# Patient Record
Sex: FEMALE | Race: BLACK OR AFRICAN AMERICAN | NOT HISPANIC OR LATINO
[De-identification: names, ages, dates, MRNs, and addresses within clinical notes are randomized per-mention and may not be internally consistent; named-entity substitution may affect disease eponyms.]

---

## 2017-01-05 ENCOUNTER — APPOINTMENT (OUTPATIENT)
Dept: HEART AND VASCULAR | Facility: CLINIC | Age: 67
End: 2017-01-05

## 2017-01-05 VITALS
WEIGHT: 160 LBS | HEART RATE: 70 BPM | SYSTOLIC BLOOD PRESSURE: 120 MMHG | BODY MASS INDEX: 28.35 KG/M2 | HEIGHT: 63 IN | DIASTOLIC BLOOD PRESSURE: 60 MMHG

## 2017-01-26 ENCOUNTER — LABORATORY RESULT (OUTPATIENT)
Age: 67
End: 2017-01-26

## 2017-01-26 ENCOUNTER — APPOINTMENT (OUTPATIENT)
Dept: ENDOCRINOLOGY | Facility: CLINIC | Age: 67
End: 2017-01-26

## 2017-01-26 VITALS
DIASTOLIC BLOOD PRESSURE: 65 MMHG | SYSTOLIC BLOOD PRESSURE: 128 MMHG | BODY MASS INDEX: 27.63 KG/M2 | WEIGHT: 156 LBS | HEART RATE: 60 BPM

## 2017-01-26 DIAGNOSIS — I10 ESSENTIAL (PRIMARY) HYPERTENSION: ICD-10-CM

## 2017-01-27 ENCOUNTER — APPOINTMENT (OUTPATIENT)
Dept: SURGERY | Facility: CLINIC | Age: 67
End: 2017-01-27

## 2017-01-27 LAB
25(OH)D3 SERPL-MCNC: 46.8 NG/ML
ALBUMIN SERPL ELPH-MCNC: 4.6 G/DL
ALP BLD-CCNC: 55 U/L
ALT SERPL-CCNC: 23 U/L
ANION GAP SERPL CALC-SCNC: 14 MMOL/L
AST SERPL-CCNC: 23 U/L
BASOPHILS # BLD AUTO: 0.04 K/UL
BASOPHILS NFR BLD AUTO: 1 %
BILIRUB SERPL-MCNC: 0.6 MG/DL
BUN SERPL-MCNC: 13 MG/DL
CALCIUM SERPL-MCNC: 9.8 MG/DL
CHLORIDE SERPL-SCNC: 103 MMOL/L
CO2 SERPL-SCNC: 26 MMOL/L
CREAT SERPL-MCNC: 0.64 MG/DL
EOSINOPHIL # BLD AUTO: 0.08 K/UL
EOSINOPHIL NFR BLD AUTO: 1.9 %
FOLATE SERPL-MCNC: >20 NG/ML
GLUCOSE SERPL-MCNC: 102 MG/DL
HBA1C MFR BLD HPLC: 6.3 %
HCT VFR BLD CALC: 35.2 %
HGB BLD-MCNC: 11.4 G/DL
IMM GRANULOCYTES NFR BLD AUTO: 1 %
LYMPHOCYTES # BLD AUTO: 1.39 K/UL
LYMPHOCYTES NFR BLD AUTO: 33.7 %
MAN DIFF?: NORMAL
MCHC RBC-ENTMCNC: 22.8 PG
MCHC RBC-ENTMCNC: 32.4 GM/DL
MCV RBC AUTO: 70.3 FL
MONOCYTES # BLD AUTO: 0.35 K/UL
MONOCYTES NFR BLD AUTO: 8.5 %
NEUTROPHILS # BLD AUTO: 2.22 K/UL
NEUTROPHILS NFR BLD AUTO: 53.9 %
PLATELET # BLD AUTO: 261 K/UL
POTASSIUM SERPL-SCNC: 4 MMOL/L
PROT SERPL-MCNC: 7.1 G/DL
RBC # BLD: 5.01 M/UL
RBC # FLD: 14.9 %
SELENIUM SERPL-MCNC: 123 UG/L
SODIUM SERPL-SCNC: 143 MMOL/L
T3 SERPL-MCNC: 75 NG/DL
T4 FREE SERPL-MCNC: 1.1 NG/DL
TSH SERPL-ACNC: 0.7 UIU/ML
VIT B12 SERPL-MCNC: 1414 PG/ML
WBC # FLD AUTO: 4.12 K/UL

## 2017-01-31 LAB
COPPER SERPL-MCNC: 130 UG/DL
VIT B1 SERPL-MCNC: 199 NMOL/L
VIT B6 SERPL-MCNC: 69.2 UG/L
ZINC SERPL-MCNC: 87 UG/DL

## 2017-02-03 ENCOUNTER — APPOINTMENT (OUTPATIENT)
Dept: SURGERY | Facility: CLINIC | Age: 67
End: 2017-02-03

## 2017-02-03 VITALS
TEMPERATURE: 97.6 F | WEIGHT: 154.06 LBS | HEIGHT: 63 IN | OXYGEN SATURATION: 98 % | BODY MASS INDEX: 27.3 KG/M2 | DIASTOLIC BLOOD PRESSURE: 79 MMHG | SYSTOLIC BLOOD PRESSURE: 126 MMHG | HEART RATE: 64 BPM

## 2017-02-09 ENCOUNTER — APPOINTMENT (OUTPATIENT)
Dept: HEART AND VASCULAR | Facility: CLINIC | Age: 67
End: 2017-02-09

## 2017-04-11 ENCOUNTER — OUTPATIENT (OUTPATIENT)
Dept: OUTPATIENT SERVICES | Facility: HOSPITAL | Age: 67
LOS: 1 days | End: 2017-04-11
Payer: COMMERCIAL

## 2017-04-11 DIAGNOSIS — Z98.49 CATARACT EXTRACTION STATUS, UNSPECIFIED EYE: Chronic | ICD-10-CM

## 2017-04-11 DIAGNOSIS — Z96.652 PRESENCE OF LEFT ARTIFICIAL KNEE JOINT: Chronic | ICD-10-CM

## 2017-04-11 DIAGNOSIS — Z90.49 ACQUIRED ABSENCE OF OTHER SPECIFIED PARTS OF DIGESTIVE TRACT: Chronic | ICD-10-CM

## 2017-04-11 DIAGNOSIS — Z98.89 OTHER SPECIFIED POSTPROCEDURAL STATES: Chronic | ICD-10-CM

## 2017-04-11 PROCEDURE — 76536 US EXAM OF HEAD AND NECK: CPT | Mod: 26

## 2017-04-11 PROCEDURE — 76536 US EXAM OF HEAD AND NECK: CPT

## 2017-04-17 ENCOUNTER — APPOINTMENT (OUTPATIENT)
Dept: HEART AND VASCULAR | Facility: CLINIC | Age: 67
End: 2017-04-17

## 2017-05-11 ENCOUNTER — LABORATORY RESULT (OUTPATIENT)
Age: 67
End: 2017-05-11

## 2017-05-11 ENCOUNTER — APPOINTMENT (OUTPATIENT)
Dept: ENDOCRINOLOGY | Facility: CLINIC | Age: 67
End: 2017-05-11

## 2017-05-11 VITALS
WEIGHT: 141 LBS | DIASTOLIC BLOOD PRESSURE: 54 MMHG | SYSTOLIC BLOOD PRESSURE: 115 MMHG | HEART RATE: 61 BPM | BODY MASS INDEX: 24.98 KG/M2

## 2017-05-11 DIAGNOSIS — E66.01 MORBID (SEVERE) OBESITY DUE TO EXCESS CALORIES: ICD-10-CM

## 2017-05-11 DIAGNOSIS — Z87.898 PERSONAL HISTORY OF OTHER SPECIFIED CONDITIONS: ICD-10-CM

## 2017-05-12 ENCOUNTER — APPOINTMENT (OUTPATIENT)
Dept: SURGERY | Facility: CLINIC | Age: 67
End: 2017-05-12

## 2017-05-12 ENCOUNTER — LABORATORY RESULT (OUTPATIENT)
Age: 67
End: 2017-05-12

## 2017-05-12 ENCOUNTER — RESULT REVIEW (OUTPATIENT)
Age: 67
End: 2017-05-12

## 2017-05-12 VITALS
SYSTOLIC BLOOD PRESSURE: 113 MMHG | TEMPERATURE: 97.9 F | DIASTOLIC BLOOD PRESSURE: 60 MMHG | WEIGHT: 141 LBS | HEART RATE: 59 BPM | HEIGHT: 63 IN | BODY MASS INDEX: 24.98 KG/M2

## 2017-05-12 LAB
25(OH)D3 SERPL-MCNC: 47.9 NG/ML
ALBUMIN SERPL ELPH-MCNC: 4.4 G/DL
ALBUMIN SERPL ELPH-MCNC: 4.5 G/DL
ALP BLD-CCNC: 50 U/L
ALP BLD-CCNC: 51 U/L
ALT SERPL-CCNC: 38 U/L
ALT SERPL-CCNC: 40 U/L
ANION GAP SERPL CALC-SCNC: 15 MMOL/L
ANION GAP SERPL CALC-SCNC: 15 MMOL/L
AST SERPL-CCNC: 33 U/L
AST SERPL-CCNC: 34 U/L
BASOPHILS # BLD AUTO: 0 K/UL
BASOPHILS # BLD AUTO: 0.07 K/UL
BASOPHILS NFR BLD AUTO: 0 %
BASOPHILS NFR BLD AUTO: 1.8 %
BILIRUB SERPL-MCNC: 0.4 MG/DL
BILIRUB SERPL-MCNC: 0.4 MG/DL
BUN SERPL-MCNC: 16 MG/DL
BUN SERPL-MCNC: 18 MG/DL
CA-I SERPL-SCNC: 1.33 MMOL/L
CALCIUM SERPL-MCNC: 10 MG/DL
CALCIUM SERPL-MCNC: 10.3 MG/DL
CALCIUM SERPL-MCNC: 10.3 MG/DL
CHLORIDE SERPL-SCNC: 101 MMOL/L
CHLORIDE SERPL-SCNC: 99 MMOL/L
CHOLEST SERPL-MCNC: 152 MG/DL
CHOLEST SERPL-MCNC: 156 MG/DL
CHOLEST/HDLC SERPL: 2 RATIO
CHOLEST/HDLC SERPL: 2 RATIO
CO2 SERPL-SCNC: 24 MMOL/L
CO2 SERPL-SCNC: 26 MMOL/L
CREAT SERPL-MCNC: 0.62 MG/DL
CREAT SERPL-MCNC: 0.71 MG/DL
CREAT SPEC-SCNC: 84 MG/DL
EOSINOPHIL # BLD AUTO: 0.2 K/UL
EOSINOPHIL # BLD AUTO: 0.34 K/UL
EOSINOPHIL NFR BLD AUTO: 5.3 %
EOSINOPHIL NFR BLD AUTO: 7.9 %
FOLATE SERPL-MCNC: >20 NG/ML
FOLATE SERPL-MCNC: >20 NG/ML
GLUCOSE SERPL-MCNC: 104 MG/DL
GLUCOSE SERPL-MCNC: 91 MG/DL
HBA1C MFR BLD HPLC: 6.1 %
HCT VFR BLD CALC: 33.8 %
HCT VFR BLD CALC: 34.5 %
HDLC SERPL-MCNC: 88 MG/DL
HDLC SERPL-MCNC: 92 MG/DL
HGB BLD-MCNC: 10.9 G/DL
HGB BLD-MCNC: 11 G/DL
INR PPP: 1.04 RATIO
IRON SATN MFR SERPL: 27 %
IRON SERPL-MCNC: 64 UG/DL
LDLC SERPL CALC-MCNC: 46 MG/DL
LDLC SERPL CALC-MCNC: 54 MG/DL
LYMPHOCYTES # BLD AUTO: 1.84 K/UL
LYMPHOCYTES # BLD AUTO: 1.92 K/UL
LYMPHOCYTES NFR BLD AUTO: 43 %
LYMPHOCYTES NFR BLD AUTO: 50.4 %
MAN DIFF?: NORMAL
MAN DIFF?: NORMAL
MCHC RBC-ENTMCNC: 23.1 PG
MCHC RBC-ENTMCNC: 23.3 PG
MCHC RBC-ENTMCNC: 31.9 GM/DL
MCHC RBC-ENTMCNC: 32.2 GM/DL
MCV RBC AUTO: 72.2 FL
MCV RBC AUTO: 72.5 FL
MICROALBUMIN 24H UR DL<=1MG/L-MCNC: 0.5 MG/DL
MICROALBUMIN/CREAT 24H UR-RTO: 6
MONOCYTES # BLD AUTO: 0.17 K/UL
MONOCYTES # BLD AUTO: 0.49 K/UL
MONOCYTES NFR BLD AUTO: 11.4 %
MONOCYTES NFR BLD AUTO: 4.4 %
NEUTROPHILS # BLD AUTO: 1.45 K/UL
NEUTROPHILS # BLD AUTO: 1.46 K/UL
NEUTROPHILS NFR BLD AUTO: 34.2 %
NEUTROPHILS NFR BLD AUTO: 38.1 %
PARATHYROID HORMONE INTACT: 46 PG/ML
PLATELET # BLD AUTO: 302 K/UL
PLATELET # BLD AUTO: 324 K/UL
POTASSIUM SERPL-SCNC: 4.5 MMOL/L
POTASSIUM SERPL-SCNC: 5 MMOL/L
PROT SERPL-MCNC: 6.9 G/DL
PROT SERPL-MCNC: 7.1 G/DL
PT BLD: 11.8 SEC
RBC # BLD: 4.68 M/UL
RBC # BLD: 4.76 M/UL
RBC # FLD: 14.7 %
RBC # FLD: 14.9 %
SODIUM SERPL-SCNC: 140 MMOL/L
SODIUM SERPL-SCNC: 140 MMOL/L
T3 SERPL-MCNC: 69 NG/DL
T4 FREE SERPL-MCNC: 1.2 NG/DL
TIBC SERPL-MCNC: 235 UG/DL
TRIGL SERPL-MCNC: 71 MG/DL
TRIGL SERPL-MCNC: 71 MG/DL
TSH SERPL-ACNC: 0.85 UIU/ML
TSH SERPL-ACNC: 0.95 UIU/ML
UIBC SERPL-MCNC: 171 UG/DL
VIT B12 SERPL-MCNC: 1054 PG/ML
VIT B12 SERPL-MCNC: 1061 PG/ML
WBC # FLD AUTO: 3.81 K/UL
WBC # FLD AUTO: 4.28 K/UL

## 2017-05-13 LAB
25(OH)D3 SERPL-MCNC: 42.8 NG/ML
PREALB SERPL NEPH-MCNC: 25 MG/DL

## 2017-05-14 LAB
VIT A SERPL-MCNC: 63 UG/DL
ZINC SERPL-MCNC: 77 UG/DL

## 2017-05-15 ENCOUNTER — APPOINTMENT (OUTPATIENT)
Dept: HEART AND VASCULAR | Facility: CLINIC | Age: 67
End: 2017-05-15

## 2017-05-15 VITALS
HEIGHT: 63 IN | DIASTOLIC BLOOD PRESSURE: 60 MMHG | BODY MASS INDEX: 24.63 KG/M2 | HEART RATE: 59 BPM | WEIGHT: 139 LBS | SYSTOLIC BLOOD PRESSURE: 102 MMHG

## 2017-05-16 LAB — VIT B1 SERPL-MCNC: 177.7 NMOL/L

## 2017-05-17 LAB
A-TOCOPHEROL VIT E SERPL-MCNC: 18.8 MG/L
BETA+GAMMA TOCOPHEROL SERPL-MCNC: <1 MG/L

## 2017-05-26 LAB
HBA1C MFR BLD HPLC: 6.2 %
VIT B1 SERPL-MCNC: 207.2 NMOL/L
VIT B6 SERPL-MCNC: 93.8 UG/L

## 2017-06-15 ENCOUNTER — APPOINTMENT (OUTPATIENT)
Dept: PLASTIC SURGERY | Facility: CLINIC | Age: 67
End: 2017-06-15

## 2017-06-29 ENCOUNTER — APPOINTMENT (OUTPATIENT)
Dept: HEART AND VASCULAR | Facility: CLINIC | Age: 67
End: 2017-06-29

## 2017-06-29 VITALS
SYSTOLIC BLOOD PRESSURE: 110 MMHG | HEIGHT: 63 IN | DIASTOLIC BLOOD PRESSURE: 62 MMHG | WEIGHT: 135 LBS | BODY MASS INDEX: 23.92 KG/M2 | HEART RATE: 75 BPM

## 2017-07-27 ENCOUNTER — APPOINTMENT (OUTPATIENT)
Dept: HEART AND VASCULAR | Facility: CLINIC | Age: 67
End: 2017-07-27
Payer: MEDICARE

## 2017-07-27 VITALS
BODY MASS INDEX: 24.1 KG/M2 | SYSTOLIC BLOOD PRESSURE: 102 MMHG | HEIGHT: 63 IN | WEIGHT: 136 LBS | HEART RATE: 55 BPM | DIASTOLIC BLOOD PRESSURE: 58 MMHG

## 2017-07-27 PROCEDURE — 99213 OFFICE O/P EST LOW 20 MIN: CPT

## 2017-08-23 ENCOUNTER — APPOINTMENT (OUTPATIENT)
Dept: ENDOCRINOLOGY | Facility: CLINIC | Age: 67
End: 2017-08-23
Payer: MEDICARE

## 2017-08-23 VITALS
BODY MASS INDEX: 24.45 KG/M2 | WEIGHT: 138 LBS | SYSTOLIC BLOOD PRESSURE: 102 MMHG | DIASTOLIC BLOOD PRESSURE: 63 MMHG | HEART RATE: 56 BPM

## 2017-08-23 PROCEDURE — 99214 OFFICE O/P EST MOD 30 MIN: CPT

## 2017-09-07 ENCOUNTER — APPOINTMENT (OUTPATIENT)
Dept: HEART AND VASCULAR | Facility: CLINIC | Age: 67
End: 2017-09-07
Payer: MEDICARE

## 2017-09-07 VITALS
HEART RATE: 60 BPM | BODY MASS INDEX: 24.09 KG/M2 | WEIGHT: 136 LBS | DIASTOLIC BLOOD PRESSURE: 70 MMHG | SYSTOLIC BLOOD PRESSURE: 106 MMHG

## 2017-09-07 PROCEDURE — 99213 OFFICE O/P EST LOW 20 MIN: CPT

## 2017-09-22 ENCOUNTER — APPOINTMENT (OUTPATIENT)
Dept: SURGERY | Facility: CLINIC | Age: 67
End: 2017-09-22
Payer: MEDICARE

## 2017-09-22 VITALS
WEIGHT: 137.5 LBS | SYSTOLIC BLOOD PRESSURE: 102 MMHG | HEIGHT: 63 IN | DIASTOLIC BLOOD PRESSURE: 61 MMHG | BODY MASS INDEX: 24.36 KG/M2 | OXYGEN SATURATION: 99 % | TEMPERATURE: 97.8 F | HEART RATE: 56 BPM

## 2017-09-22 PROCEDURE — 99213 OFFICE O/P EST LOW 20 MIN: CPT

## 2017-10-05 ENCOUNTER — APPOINTMENT (OUTPATIENT)
Dept: PLASTIC SURGERY | Facility: CLINIC | Age: 67
End: 2017-10-05

## 2017-11-17 ENCOUNTER — RX RENEWAL (OUTPATIENT)
Age: 67
End: 2017-11-17

## 2017-12-04 ENCOUNTER — LABORATORY RESULT (OUTPATIENT)
Age: 67
End: 2017-12-04

## 2017-12-08 LAB
25(OH)D3 SERPL-MCNC: 53.4 NG/ML
ALBUMIN SERPL ELPH-MCNC: 4.7 G/DL
ALP BLD-CCNC: 70 U/L
ALT SERPL-CCNC: 187 U/L
ANION GAP SERPL CALC-SCNC: 18 MMOL/L
AST SERPL-CCNC: 103 U/L
BASOPHILS # BLD AUTO: 0.09 K/UL
BASOPHILS NFR BLD AUTO: 2.6 %
BILIRUB SERPL-MCNC: 0.5 MG/DL
BUN SERPL-MCNC: 14 MG/DL
CALCIUM SERPL-MCNC: 10.4 MG/DL
CHLORIDE SERPL-SCNC: 98 MMOL/L
CHOLEST SERPL-MCNC: 160 MG/DL
CHOLEST/HDLC SERPL: 1.8 RATIO
CO2 SERPL-SCNC: 25 MMOL/L
COPPER SERPL-MCNC: 129 UG/DL
CREAT SERPL-MCNC: 0.81 MG/DL
CREAT SPEC-SCNC: 67 MG/DL
EOSINOPHIL # BLD AUTO: 0.22 K/UL
EOSINOPHIL NFR BLD AUTO: 6.1
FOLATE SERPL-MCNC: >20 NG/ML
GLUCOSE SERPL-MCNC: 104 MG/DL
HBA1C MFR BLD HPLC: 6.3 %
HCT VFR BLD CALC: 34.6 %
HDLC SERPL-MCNC: 89 MG/DL
HGB BLD-MCNC: 11.3 G/DL
IRON SERPL-MCNC: 55 UG/DL
LDLC SERPL CALC-MCNC: 58 MG/DL
LYMPHOCYTES # BLD AUTO: 1.66 K/UL
LYMPHOCYTES NFR BLD AUTO: 46.1 %
MAN DIFF?: NORMAL
MCHC RBC-ENTMCNC: 23.8 PG
MCHC RBC-ENTMCNC: 32.7 GM/DL
MCV RBC AUTO: 73 FL
MICROALBUMIN 24H UR DL<=1MG/L-MCNC: <0.3 MG/DL
MICROALBUMIN/CREAT 24H UR-RTO: NORMAL
MONOCYTES # BLD AUTO: 0.15 K/UL
MONOCYTES NFR BLD AUTO: 4.3 %
NEUTROPHILS # BLD AUTO: 1.41 K/UL
NEUTROPHILS NFR BLD AUTO: 39.1 %
PLATELET # BLD AUTO: 320 K/UL
POTASSIUM SERPL-SCNC: 4.3 MMOL/L
PREALB SERPL NEPH-MCNC: 28 MG/DL
PROT SERPL-MCNC: 7.4 G/DL
RBC # BLD: 4.74 M/UL
RBC # FLD: 13.3 %
SELENIUM SERPL-MCNC: 160 UG/L
SODIUM SERPL-SCNC: 141 MMOL/L
T3 SERPL-MCNC: 72 NG/DL
T4 FREE SERPL-MCNC: 1.1 NG/DL
TRIGL SERPL-MCNC: 67 MG/DL
TSH SERPL-ACNC: 1.09 UIU/ML
VIT B1 SERPL-MCNC: 160.1 NMOL/L
VIT B12 SERPL-MCNC: 1289 PG/ML
VIT B6 SERPL-MCNC: 72.5 UG/L
WBC # FLD AUTO: 3.6 K/UL
ZINC SERPL-MCNC: 90 UG/DL

## 2017-12-13 ENCOUNTER — APPOINTMENT (OUTPATIENT)
Dept: ENDOCRINOLOGY | Facility: CLINIC | Age: 67
End: 2017-12-13
Payer: MEDICARE

## 2017-12-13 VITALS
DIASTOLIC BLOOD PRESSURE: 68 MMHG | HEART RATE: 59 BPM | HEIGHT: 63 IN | WEIGHT: 135 LBS | BODY MASS INDEX: 23.92 KG/M2 | SYSTOLIC BLOOD PRESSURE: 120 MMHG

## 2017-12-13 DIAGNOSIS — R74.8 ABNORMAL LEVELS OF OTHER SERUM ENZYMES: ICD-10-CM

## 2017-12-13 DIAGNOSIS — E11.65 TYPE 2 DIABETES MELLITUS WITH HYPERGLYCEMIA: ICD-10-CM

## 2017-12-13 PROCEDURE — 99214 OFFICE O/P EST MOD 30 MIN: CPT

## 2017-12-21 ENCOUNTER — LABORATORY RESULT (OUTPATIENT)
Age: 67
End: 2017-12-21

## 2017-12-21 LAB
BASOPHILS # BLD AUTO: 0.17 K/UL
BASOPHILS NFR BLD AUTO: 5.3 %
CA-I SERPL-SCNC: 1.29 MMOL/L
CALCIUM SERPL-MCNC: 10 MG/DL
EOSINOPHIL # BLD AUTO: 0.14 K/UL
EOSINOPHIL NFR BLD AUTO: 4.4
FOLATE SERPL-MCNC: >20 NG/ML
HCT VFR BLD CALC: 34.7 %
HGB BLD-MCNC: 11.1 G/DL
INR PPP: 1.04 RATIO
LYMPHOCYTES # BLD AUTO: 1.5 K/UL
LYMPHOCYTES NFR BLD AUTO: 46.5 %
MAN DIFF?: NORMAL
MCHC RBC-ENTMCNC: 23.5 PG
MCHC RBC-ENTMCNC: 32 GM/DL
MCV RBC AUTO: 73.5 FL
MONOCYTES # BLD AUTO: 0.14 K/UL
MONOCYTES NFR BLD AUTO: 4.4 %
NEUTROPHILS # BLD AUTO: 1.16 K/UL
NEUTROPHILS NFR BLD AUTO: 36 %
PARATHYROID HORMONE INTACT: 64 PG/ML
PLATELET # BLD AUTO: 250 K/UL
PT BLD: 11.8 SEC
RBC # BLD: 4.72 M/UL
RBC # FLD: 13.4 %
TSH SERPL-ACNC: 0.77 UIU/ML
VIT B12 SERPL-MCNC: 898 PG/ML
WBC # FLD AUTO: 3.23 K/UL

## 2017-12-22 LAB
25(OH)D3 SERPL-MCNC: 49.1 NG/ML
ALBUMIN SERPL ELPH-MCNC: 4.7 G/DL
ALP BLD-CCNC: 62 U/L
ALT SERPL-CCNC: 93 U/L
ANION GAP SERPL CALC-SCNC: 14 MMOL/L
AST SERPL-CCNC: 43 U/L
BILIRUB SERPL-MCNC: 0.6 MG/DL
BUN SERPL-MCNC: 17 MG/DL
CALCIUM SERPL-MCNC: 10 MG/DL
CHLORIDE SERPL-SCNC: 102 MMOL/L
CHOLEST SERPL-MCNC: 160 MG/DL
CHOLEST/HDLC SERPL: 1.6 RATIO
CO2 SERPL-SCNC: 28 MMOL/L
CREAT SERPL-MCNC: 0.7 MG/DL
GLUCOSE SERPL-MCNC: 95 MG/DL
HBA1C MFR BLD HPLC: 6.2 %
HDLC SERPL-MCNC: 97 MG/DL
IRON SATN MFR SERPL: 30 %
IRON SERPL-MCNC: 78 UG/DL
LDLC SERPL CALC-MCNC: 50 MG/DL
POTASSIUM SERPL-SCNC: 4.5 MMOL/L
PREALB SERPL NEPH-MCNC: 25 MG/DL
PROT SERPL-MCNC: 7.2 G/DL
SODIUM SERPL-SCNC: 144 MMOL/L
TIBC SERPL-MCNC: 261 UG/DL
TRIGL SERPL-MCNC: 66 MG/DL
UIBC SERPL-MCNC: 183 UG/DL

## 2017-12-23 LAB — VIT B1 SERPL-MCNC: 183.8 NMOL/L

## 2017-12-26 LAB — VIT A SERPL-MCNC: 106 UG/DL

## 2017-12-27 LAB — ZINC SERPL-MCNC: 73 UG/DL

## 2017-12-28 LAB
A-TOCOPHEROL VIT E SERPL-MCNC: 15.3 MG/L
BETA+GAMMA TOCOPHEROL SERPL-MCNC: <1 MG/L

## 2018-01-12 ENCOUNTER — MEDICATION RENEWAL (OUTPATIENT)
Age: 68
End: 2018-01-12

## 2018-01-16 ENCOUNTER — RX RENEWAL (OUTPATIENT)
Age: 68
End: 2018-01-16

## 2018-01-26 ENCOUNTER — APPOINTMENT (OUTPATIENT)
Dept: SURGERY | Facility: CLINIC | Age: 68
End: 2018-01-26
Payer: MEDICARE

## 2018-01-26 VITALS
BODY MASS INDEX: 23.39 KG/M2 | HEART RATE: 55 BPM | WEIGHT: 132 LBS | TEMPERATURE: 97.6 F | DIASTOLIC BLOOD PRESSURE: 77 MMHG | OXYGEN SATURATION: 99 % | SYSTOLIC BLOOD PRESSURE: 127 MMHG | HEIGHT: 63 IN

## 2018-01-26 PROCEDURE — 99213 OFFICE O/P EST LOW 20 MIN: CPT

## 2018-02-08 ENCOUNTER — APPOINTMENT (OUTPATIENT)
Dept: HEART AND VASCULAR | Facility: CLINIC | Age: 68
End: 2018-02-08
Payer: COMMERCIAL

## 2018-02-08 VITALS
WEIGHT: 136 LBS | DIASTOLIC BLOOD PRESSURE: 68 MMHG | HEART RATE: 69 BPM | BODY MASS INDEX: 24.1 KG/M2 | SYSTOLIC BLOOD PRESSURE: 110 MMHG | HEIGHT: 63 IN

## 2018-02-08 PROCEDURE — 99214 OFFICE O/P EST MOD 30 MIN: CPT

## 2018-02-16 ENCOUNTER — MEDICATION RENEWAL (OUTPATIENT)
Age: 68
End: 2018-02-16

## 2018-04-23 ENCOUNTER — APPOINTMENT (OUTPATIENT)
Dept: HEART AND VASCULAR | Facility: CLINIC | Age: 68
End: 2018-04-23
Payer: COMMERCIAL

## 2018-04-23 VITALS
WEIGHT: 136 LBS | DIASTOLIC BLOOD PRESSURE: 70 MMHG | BODY MASS INDEX: 24.09 KG/M2 | SYSTOLIC BLOOD PRESSURE: 120 MMHG | HEART RATE: 53 BPM

## 2018-04-23 PROCEDURE — 99213 OFFICE O/P EST LOW 20 MIN: CPT | Mod: 25

## 2018-04-23 PROCEDURE — 93000 ELECTROCARDIOGRAM COMPLETE: CPT

## 2018-04-27 ENCOUNTER — APPOINTMENT (OUTPATIENT)
Dept: SURGERY | Facility: CLINIC | Age: 68
End: 2018-04-27
Payer: MEDICARE

## 2018-04-27 VITALS
TEMPERATURE: 97.5 F | HEART RATE: 52 BPM | HEIGHT: 63 IN | OXYGEN SATURATION: 99 % | SYSTOLIC BLOOD PRESSURE: 132 MMHG | WEIGHT: 138.25 LBS | DIASTOLIC BLOOD PRESSURE: 70 MMHG | BODY MASS INDEX: 24.5 KG/M2

## 2018-04-27 PROCEDURE — 99213 OFFICE O/P EST LOW 20 MIN: CPT

## 2018-04-30 ENCOUNTER — APPOINTMENT (OUTPATIENT)
Dept: ENDOCRINOLOGY | Facility: CLINIC | Age: 68
End: 2018-04-30
Payer: MEDICARE

## 2018-04-30 VITALS
WEIGHT: 138 LBS | DIASTOLIC BLOOD PRESSURE: 59 MMHG | HEART RATE: 54 BPM | BODY MASS INDEX: 24.45 KG/M2 | SYSTOLIC BLOOD PRESSURE: 109 MMHG

## 2018-04-30 DIAGNOSIS — E04.2 NONTOXIC MULTINODULAR GOITER: ICD-10-CM

## 2018-04-30 PROCEDURE — 99215 OFFICE O/P EST HI 40 MIN: CPT

## 2018-04-30 RX ORDER — METFORMIN HYDROCHLORIDE 1000 MG/1
1000 TABLET, COATED ORAL TWICE DAILY
Qty: 60 | Refills: 5 | Status: DISCONTINUED | COMMUNITY
Start: 2018-01-12 | End: 2018-04-30

## 2018-04-30 RX ORDER — TIMOLOL MALEATE 5 MG/ML
0.5 SOLUTION OPHTHALMIC
Qty: 10 | Refills: 0 | Status: ACTIVE | COMMUNITY
Start: 2018-04-18

## 2018-07-18 ENCOUNTER — APPOINTMENT (OUTPATIENT)
Dept: RADIOLOGY | Facility: HOSPITAL | Age: 68
End: 2018-07-18

## 2018-08-14 ENCOUNTER — APPOINTMENT (OUTPATIENT)
Dept: ULTRASOUND IMAGING | Facility: HOSPITAL | Age: 68
End: 2018-08-14

## 2018-08-28 ENCOUNTER — APPOINTMENT (OUTPATIENT)
Dept: ENDOCRINOLOGY | Facility: CLINIC | Age: 68
End: 2018-08-28
Payer: COMMERCIAL

## 2018-08-28 VITALS
BODY MASS INDEX: 24.45 KG/M2 | DIASTOLIC BLOOD PRESSURE: 56 MMHG | HEART RATE: 47 BPM | WEIGHT: 138 LBS | HEIGHT: 63 IN | SYSTOLIC BLOOD PRESSURE: 112 MMHG

## 2018-08-28 PROCEDURE — 99214 OFFICE O/P EST MOD 30 MIN: CPT

## 2018-08-29 LAB
25(OH)D3 SERPL-MCNC: 49.9 NG/ML
ALBUMIN SERPL ELPH-MCNC: 4.4 G/DL
ALP BLD-CCNC: 57 U/L
ALT SERPL-CCNC: 26 U/L
ANION GAP SERPL CALC-SCNC: 14 MMOL/L
AST SERPL-CCNC: 26 U/L
BILIRUB SERPL-MCNC: 0.5 MG/DL
BUN SERPL-MCNC: 15 MG/DL
CALCIUM SERPL-MCNC: 9.7 MG/DL
CALCIUM SERPL-MCNC: 9.7 MG/DL
CHLORIDE SERPL-SCNC: 99 MMOL/L
CHOLEST SERPL-MCNC: 159 MG/DL
CHOLEST/HDLC SERPL: 2.2 RATIO
CO2 SERPL-SCNC: 26 MMOL/L
CREAT SERPL-MCNC: 0.75 MG/DL
CREAT SPEC-SCNC: 17 MG/DL
GLUCOSE SERPL-MCNC: 112 MG/DL
HBA1C MFR BLD HPLC: 6.7 %
HDLC SERPL-MCNC: 71 MG/DL
LDLC SERPL CALC-MCNC: 71 MG/DL
MICROALBUMIN 24H UR DL<=1MG/L-MCNC: <1.2 MG/DL
MICROALBUMIN/CREAT 24H UR-RTO: NORMAL
PARATHYROID HORMONE INTACT: 47 PG/ML
POTASSIUM SERPL-SCNC: 4.9 MMOL/L
PROT SERPL-MCNC: 6.9 G/DL
SODIUM SERPL-SCNC: 139 MMOL/L
TRIGL SERPL-MCNC: 87 MG/DL

## 2018-10-19 ENCOUNTER — APPOINTMENT (OUTPATIENT)
Dept: SURGERY | Facility: CLINIC | Age: 68
End: 2018-10-19
Payer: COMMERCIAL

## 2018-10-19 VITALS
TEMPERATURE: 98 F | OXYGEN SATURATION: 100 % | HEART RATE: 51 BPM | DIASTOLIC BLOOD PRESSURE: 60 MMHG | WEIGHT: 139.5 LBS | HEIGHT: 63 IN | BODY MASS INDEX: 24.72 KG/M2 | SYSTOLIC BLOOD PRESSURE: 125 MMHG

## 2018-10-19 DIAGNOSIS — E66.01 MORBID (SEVERE) OBESITY DUE TO EXCESS CALORIES: ICD-10-CM

## 2018-10-19 PROCEDURE — 99213 OFFICE O/P EST LOW 20 MIN: CPT

## 2018-11-26 ENCOUNTER — APPOINTMENT (OUTPATIENT)
Dept: HEART AND VASCULAR | Facility: CLINIC | Age: 68
End: 2018-11-26
Payer: COMMERCIAL

## 2018-11-26 VITALS
DIASTOLIC BLOOD PRESSURE: 64 MMHG | SYSTOLIC BLOOD PRESSURE: 112 MMHG | WEIGHT: 136 LBS | HEART RATE: 56 BPM | HEIGHT: 63 IN | BODY MASS INDEX: 24.1 KG/M2

## 2018-11-26 PROCEDURE — 99213 OFFICE O/P EST LOW 20 MIN: CPT

## 2018-11-26 RX ORDER — LISINOPRIL 2.5 MG/1
2.5 TABLET ORAL DAILY
Qty: 30 | Refills: 6 | Status: DISCONTINUED | COMMUNITY
End: 2018-11-26

## 2018-11-26 NOTE — DISCUSSION/SUMMARY
[Patient] : the patient [___ Month(s)] : [unfilled] month(s) [FreeTextEntry1] : 67 y/o female with h/o htn, niddm, obesity, near-syncope, hl, s/p robotic assisted lap. gastric bypass and supraumbilical hernia repair here for f/up otday\par \par -Labs 12/17, 8/18 reviewed\par -ekg 4/18 - SB, low voltage, nsra\par -EKG 5/17 reviewed - SB, normal intervals, nsra\par -Continue aspirin \par -Unable to tolerate lipitor and zocor and (crestor - did not want to pay high copay), will continue pravastatin 20 mg qhs \par -d/c lisinopril and repeat bp in 3 weeks, close monitoring of bp\par -Strict blood sugar control for DM management, f/up with Endocrine \par -Echo 8/14- nl ef/wall motion/no significant valuvlar disease\par -Adenosine nuclear stress test 8/14- normal perfusion, no ekg changes\par -F/up 5 months\par

## 2018-12-07 ENCOUNTER — APPOINTMENT (OUTPATIENT)
Dept: SURGERY | Facility: CLINIC | Age: 68
End: 2018-12-07
Payer: COMMERCIAL

## 2018-12-07 VITALS
HEIGHT: 63 IN | DIASTOLIC BLOOD PRESSURE: 75 MMHG | SYSTOLIC BLOOD PRESSURE: 159 MMHG | HEART RATE: 57 BPM | TEMPERATURE: 97.9 F | WEIGHT: 140 LBS | OXYGEN SATURATION: 99 % | BODY MASS INDEX: 24.8 KG/M2

## 2018-12-07 PROCEDURE — 99213 OFFICE O/P EST LOW 20 MIN: CPT

## 2019-04-23 ENCOUNTER — APPOINTMENT (OUTPATIENT)
Dept: ENDOCRINOLOGY | Facility: CLINIC | Age: 69
End: 2019-04-23
Payer: COMMERCIAL

## 2019-04-23 VITALS
DIASTOLIC BLOOD PRESSURE: 64 MMHG | BODY MASS INDEX: 25.16 KG/M2 | SYSTOLIC BLOOD PRESSURE: 117 MMHG | HEIGHT: 63 IN | WEIGHT: 142 LBS | HEART RATE: 50 BPM

## 2019-04-23 PROCEDURE — 99214 OFFICE O/P EST MOD 30 MIN: CPT

## 2019-04-23 NOTE — HISTORY OF PRESENT ILLNESS
[FreeTextEntry1] : A1c went up to 7.2 last month with PCP\par eating whole wheat pumpernickel bagels 3x/week\par eating various fruits: pineapple, nehemiah, berries, and maybe portions are too big\par has Greek yogurt with splenda and fruit every day.\par walking regularly.  walked 26 blocks yesterday\par weight self daily and tries to keep weight within a 5 lb range.  once she is at the upper end of the range, then she reduces her fruit intake\par up to date with ophtho. her general ophtho left [facility] but she is still seeing the retina doctor\par no polyuria, polydipsia, SOB, chest pain, or neuropathy symptoms \par sees podiatry every 3 months\par \par PMH: diabetes, dx age 42.  s/p vitrectomy, laser\par s/p gastric bypass 5/16\par L knee replaced 2011\par s/p cholcyestectomy and appendectomy\par \par Meds:\par metformin ER 500mg/day\par calcium citrate + Mg + Zinc (Fowler brand). 2 tab/day (2 tabs = 500mg calcium, 800 IU vitamin D, Mg 80mg, Zn 10mg)\par Centrum silver chewable, 2 per day\par B complex liquid, every 2 weeks.  vitamin B1 daily.\par FeSol, once a day. \par Benefiber. Colace every day.  Prilosec or Nexium (whichever one is on sale)\par lisinopril 2.5mg/day\par pravastatin 20mg\par timolol eye drops\par Zyrtec prn.\par

## 2019-04-23 NOTE — ASSESSMENT
[FreeTextEntry1] : Diabetes, A1c increasing. h/o retinopathy. Increase metformin back to BID dosing and advised pt to be mindful about fruit portion sizes.  Limit fruit servings to half to 3/4 cup servings.  Try to limit bagels to 1 per week, or eat only half bagel when she is eating bagels (then will be 1.5 bagels per week, if she continues eating them 3x/week)\par \par Thyroid nodules, small (sub-cm).  will send for thyroid sono this summer.\par RTO 6 months

## 2019-04-23 NOTE — DATA REVIEWED
[FreeTextEntry1] : 3/19 (per patient): A1c 7.2%\par 8/18: A1c 6.7%, tot chol 159, trig 87, HDL 71, LDL 71, Cr 0.75, urine microalbumin < 1.2, 25D 49.9\par 12/17: A1c 6.2%,  tot chol 160, HDL 97, LDL 50, trig 66, Hb 11.1, 25D 49.1, folate >20, Ca 10.0, PTH 64, TSH 0.77\par \par thyroid sono, 4/17: homogenous\par R mid nodule 9mm, no suspicious features \par R upper nodule 4mm, no suspicious features \par L upper nodule 9mm, no suspicious features \par L upper nodule 4mm, no suspicious features \par \par bone density, 7/18:\par L1-L4 T -0.8\par R fem neck T -1.1, L fem neck T -1.5\par R tot hip T -0.4; L tot hip T -0.7\par . \par \par

## 2019-04-26 ENCOUNTER — APPOINTMENT (OUTPATIENT)
Dept: SURGERY | Facility: CLINIC | Age: 69
End: 2019-04-26
Payer: COMMERCIAL

## 2019-04-26 VITALS
HEIGHT: 63 IN | TEMPERATURE: 96.9 F | SYSTOLIC BLOOD PRESSURE: 113 MMHG | DIASTOLIC BLOOD PRESSURE: 73 MMHG | OXYGEN SATURATION: 98 % | BODY MASS INDEX: 25.24 KG/M2 | HEART RATE: 52 BPM | WEIGHT: 142.44 LBS

## 2019-04-26 DIAGNOSIS — Z00.00 ENCOUNTER FOR GENERAL ADULT MEDICAL EXAMINATION W/OUT ABNORMAL FINDINGS: ICD-10-CM

## 2019-04-26 PROCEDURE — 99213 OFFICE O/P EST LOW 20 MIN: CPT

## 2019-04-26 NOTE — HISTORY OF PRESENT ILLNESS
[de-identified] : Pt is a 68 y/o F s/p bypass surgery 3 yrs ago who presents today feeling well. Pt recently saw endo, a1c was elevated at 7.0. Pt metformin was increased to twice daily, and was started on a low glycemic diet. Pt reports compliance with vitamin regimen at home, taking calcium daily for osteopenia. Pt states she is going to the bathroom with no problems, and denies n,v. States she is taking fiber supplement daily. States her last colonoscopy  was 2 years ago, polyp found and was benign. F/u in q5 yrs. Pt has history of dilated common bile duct, will repeat labs to monitor LFT's.

## 2019-05-21 LAB
25(OH)D3 SERPL-MCNC: 53.5 NG/ML
A-TOCOPHEROL VIT E SERPL-MCNC: 14.1 MG/L
ALBUMIN SERPL ELPH-MCNC: 4.5 G/DL
ALP BLD-CCNC: 67 U/L
ALT SERPL-CCNC: 57 U/L
ANION GAP SERPL CALC-SCNC: 11 MMOL/L
AST SERPL-CCNC: 37 U/L
BASOPHILS # BLD AUTO: 0.04 K/UL
BASOPHILS NFR BLD AUTO: 1 %
BETA+GAMMA TOCOPHEROL SERPL-MCNC: 0.1 MG/L
BILIRUB SERPL-MCNC: 0.4 MG/DL
BUN SERPL-MCNC: 18 MG/DL
CA-I SERPL-SCNC: 1.29 MMOL/L
CALCIUM SERPL-MCNC: 9.9 MG/DL
CALCIUM SERPL-MCNC: 9.9 MG/DL
CHLORIDE SERPL-SCNC: 102 MMOL/L
CHOLEST SERPL-MCNC: 158 MG/DL
CHOLEST/HDLC SERPL: 2 RATIO
CO2 SERPL-SCNC: 28 MMOL/L
CREAT SERPL-MCNC: 0.75 MG/DL
EOSINOPHIL # BLD AUTO: 0.18 K/UL
EOSINOPHIL NFR BLD AUTO: 4.6 %
ESTIMATED AVERAGE GLUCOSE: 174 MG/DL
FOLATE SERPL-MCNC: >20 NG/ML
GLUCOSE SERPL-MCNC: 115 MG/DL
HBA1C MFR BLD HPLC: 7.7 %
HCT VFR BLD CALC: 40.5 %
HDLC SERPL-MCNC: 78 MG/DL
HGB BLD-MCNC: 12.2 G/DL
IMM GRANULOCYTES NFR BLD AUTO: 0 %
INR PPP: 1.06 RATIO
IRON SATN MFR SERPL: 28 %
IRON SERPL-MCNC: 81 UG/DL
LDLC SERPL CALC-MCNC: 64 MG/DL
LYMPHOCYTES # BLD AUTO: 1.64 K/UL
LYMPHOCYTES NFR BLD AUTO: 41.5 %
MAN DIFF?: NORMAL
MCHC RBC-ENTMCNC: 23 PG
MCHC RBC-ENTMCNC: 30.1 GM/DL
MCV RBC AUTO: 76.3 FL
MONOCYTES # BLD AUTO: 0.32 K/UL
MONOCYTES NFR BLD AUTO: 8.1 %
NEUTROPHILS # BLD AUTO: 1.77 K/UL
NEUTROPHILS NFR BLD AUTO: 44.8 %
PARATHYROID HORMONE INTACT: 67 PG/ML
PLATELET # BLD AUTO: 265 K/UL
POTASSIUM SERPL-SCNC: 4.8 MMOL/L
PREALB SERPL NEPH-MCNC: 26 MG/DL
PROT SERPL-MCNC: 6.8 G/DL
PT BLD: 12.2 SEC
RBC # BLD: 5.31 M/UL
RBC # FLD: 13.3 %
SODIUM SERPL-SCNC: 141 MMOL/L
TIBC SERPL-MCNC: 286 UG/DL
TRIGL SERPL-MCNC: 81 MG/DL
TSH SERPL-ACNC: 0.78 UIU/ML
UIBC SERPL-MCNC: 205 UG/DL
VIT A SERPL-MCNC: 62.6 UG/DL
VIT B1 SERPL-MCNC: 206.5 NMOL/L
VIT B12 SERPL-MCNC: 1170 PG/ML
WBC # FLD AUTO: 3.95 K/UL
ZINC SERPL-MCNC: 102 UG/DL

## 2019-06-04 ENCOUNTER — MEDICATION RENEWAL (OUTPATIENT)
Age: 69
End: 2019-06-04

## 2019-06-05 ENCOUNTER — FORM ENCOUNTER (OUTPATIENT)
Age: 69
End: 2019-06-05

## 2019-06-06 ENCOUNTER — OUTPATIENT (OUTPATIENT)
Dept: OUTPATIENT SERVICES | Facility: HOSPITAL | Age: 69
LOS: 1 days | End: 2019-06-06
Payer: COMMERCIAL

## 2019-06-06 ENCOUNTER — APPOINTMENT (OUTPATIENT)
Dept: ULTRASOUND IMAGING | Facility: HOSPITAL | Age: 69
End: 2019-06-06
Payer: COMMERCIAL

## 2019-06-06 DIAGNOSIS — Z98.49 CATARACT EXTRACTION STATUS, UNSPECIFIED EYE: Chronic | ICD-10-CM

## 2019-06-06 DIAGNOSIS — Z90.49 ACQUIRED ABSENCE OF OTHER SPECIFIED PARTS OF DIGESTIVE TRACT: Chronic | ICD-10-CM

## 2019-06-06 DIAGNOSIS — Z98.89 OTHER SPECIFIED POSTPROCEDURAL STATES: Chronic | ICD-10-CM

## 2019-06-06 DIAGNOSIS — Z96.652 PRESENCE OF LEFT ARTIFICIAL KNEE JOINT: Chronic | ICD-10-CM

## 2019-06-06 PROCEDURE — 76536 US EXAM OF HEAD AND NECK: CPT | Mod: 26

## 2019-06-06 PROCEDURE — 76536 US EXAM OF HEAD AND NECK: CPT

## 2019-06-11 ENCOUNTER — RESULT REVIEW (OUTPATIENT)
Age: 69
End: 2019-06-11

## 2019-06-20 ENCOUNTER — APPOINTMENT (OUTPATIENT)
Dept: HEART AND VASCULAR | Facility: CLINIC | Age: 69
End: 2019-06-20
Payer: COMMERCIAL

## 2019-06-20 ENCOUNTER — NON-APPOINTMENT (OUTPATIENT)
Age: 69
End: 2019-06-20

## 2019-06-20 VITALS
HEIGHT: 63 IN | HEART RATE: 57 BPM | DIASTOLIC BLOOD PRESSURE: 68 MMHG | WEIGHT: 142 LBS | SYSTOLIC BLOOD PRESSURE: 120 MMHG | BODY MASS INDEX: 25.16 KG/M2

## 2019-06-20 PROCEDURE — 93000 ELECTROCARDIOGRAM COMPLETE: CPT

## 2019-06-20 PROCEDURE — 99213 OFFICE O/P EST LOW 20 MIN: CPT | Mod: 25

## 2019-06-20 NOTE — DISCUSSION/SUMMARY
[Patient] : the patient [___ Month(s)] : [unfilled] month(s) [FreeTextEntry1] : 70 y/o female with h/o htn, niddm, obesity, near-syncope, hl, s/p robotic assisted lap. gastric bypass and supraumbilical hernia repair here for f/up otday\par \par -Labs 4/19 reviewed\par -ordered ekg today - SB, normal intervals, nsra\par -ekg 4/18 - SB, low voltage, nsra\par -Continue aspirin \par -Unable to tolerate lipitor and zocor and (crestor - did not want to pay high copay), will continue pravastatin 20 mg qhs \par -off ACE\par -Strict blood sugar control for DM management, f/up with Endocrine \par -Echo 8/14- nl ef/wall motion/no significant valuvlar disease\par -Adenosine nuclear stress test 8/14- normal perfusion, no ekg changes\par -F/up 6 months\par

## 2019-06-20 NOTE — HISTORY OF PRESENT ILLNESS
[FreeTextEntry1] : Referred by: PCP: Dr. Kitchen\par \par 70 y/o female with h/o htn, niddm, obesity, near-syncope, hl, who presents for f/up today s/p robotic assisted lap. gastric bypass and supraumbilical hernia repair . \par \par \par  Mild LFT elevation noted on recent labs and recent increase in hgba1c\par Seen by endo and had thyroid u/s for nodules\par \par last visit lisinopril dc'd\par \par Feeling well and walking daily. \par \par She denies cp, sob, palpitations, lh, syncope, edema, orthopnea, pnd. \par \par In  she had a normal echo and pharm nuclear stress test.\par She was unable to tolerate both lipitor, zocor due to side effects and did not want to pay high copay for crestor. She had been using pravastatin and tolerating it. \par \par \par PRIOR history:\par Episode of vasovagal with near-syncope -  at [facility] and taken to hospital for 3 days with unrevealing tests. \par \par \par \par ECHO: 14: normal lv ef 65%, normal wall motion, trivial pericardial effusion, no significant valvular disease\par \par Imelda-Nuclear: : no ekg changes, normal perfusion, normal ef 66% and wall motion\par \par \par \par \par \par \par PMH/PSH:\par s/p robotic assisted lap. gastric bypass and supraumbilical hernia repair \par Non-insulin Diabetes - 20 years\par glaucoma\par plantar fascittis\par s/p open janice/appendix - age 22\par s/p left knee replacement - 2011\par bilateral cataract surgery - \par right vitrectomy - \par HTN\par morbid obesity\par osteopenia\par HL\par vasovagal syncope - \par \par \par SH:\par retired  - prior job - worked for [employer]/\par quit tobacco  - smoked on/off 10 years\par quit etoh \par no drugs\par single\par no children\par lives alone\par from [place]\par \par ALL:\par aspirin -> retinal bleed\par codeine -> constipation\par latex -> contact dermatitis\par iv dye -> shortness of breath\par lipitor/zocor -> stomach pain\par \par \par \par \par MEDS:\par asa 81 mg daily\par pravastatin 20 mg qhs\par metformin 500 mg bid\par citracal\par mvi\par vit B12/B1\par flax seed\par fesol\par \par \par \par FH:\par mother -  breast cancer age 60\par father -  lung cancer age 76\par 1 brother - age 28, healthy\par

## 2020-01-02 ENCOUNTER — APPOINTMENT (OUTPATIENT)
Dept: HEART AND VASCULAR | Facility: CLINIC | Age: 70
End: 2020-01-02

## 2020-01-14 ENCOUNTER — APPOINTMENT (OUTPATIENT)
Dept: SURGERY | Facility: CLINIC | Age: 70
End: 2020-01-14
Payer: COMMERCIAL

## 2020-01-14 VITALS
HEART RATE: 53 BPM | TEMPERATURE: 98.5 F | HEIGHT: 63 IN | SYSTOLIC BLOOD PRESSURE: 118 MMHG | WEIGHT: 151.13 LBS | BODY MASS INDEX: 26.78 KG/M2 | DIASTOLIC BLOOD PRESSURE: 73 MMHG | OXYGEN SATURATION: 97 %

## 2020-01-14 DIAGNOSIS — Z98.84 BARIATRIC SURGERY STATUS: ICD-10-CM

## 2020-01-14 LAB
BASOPHILS # BLD AUTO: 0.04 K/UL
BASOPHILS NFR BLD AUTO: 0.8 %
EOSINOPHIL # BLD AUTO: 0.08 K/UL
EOSINOPHIL NFR BLD AUTO: 1.7 %
HCT VFR BLD CALC: 41.8 %
HGB BLD-MCNC: 12.5 G/DL
IMM GRANULOCYTES NFR BLD AUTO: 0.2 %
LYMPHOCYTES # BLD AUTO: 1.67 K/UL
LYMPHOCYTES NFR BLD AUTO: 35.3 %
MAN DIFF?: NORMAL
MCHC RBC-ENTMCNC: 23.1 PG
MCHC RBC-ENTMCNC: 29.9 GM/DL
MCV RBC AUTO: 77.3 FL
MONOCYTES # BLD AUTO: 0.4 K/UL
MONOCYTES NFR BLD AUTO: 8.5 %
NEUTROPHILS # BLD AUTO: 2.53 K/UL
NEUTROPHILS NFR BLD AUTO: 53.5 %
PLATELET # BLD AUTO: 265 K/UL
RBC # BLD: 5.41 M/UL
RBC # FLD: 13.6 %
WBC # FLD AUTO: 4.73 K/UL

## 2020-01-14 PROCEDURE — 99213 OFFICE O/P EST LOW 20 MIN: CPT

## 2020-01-14 NOTE — ADDENDUM
[FreeTextEntry1] : This note was written by Zully Constantino on 01/14/2020  acting as scribe for Dr. Carranza

## 2020-01-14 NOTE — ASSESSMENT
[FreeTextEntry1] : 68 y/o F s/p bypass surgery 5/5/16 presents for follow up. Doing okay but with ~9 lb weight gain. I advised patient to cut back on sugar and carbohydrates in order to lower her A1C level. Patient will meet with nutrition. I ordered blood work. Patient will follow up in 6 months.

## 2020-01-14 NOTE — END OF VISIT
[FreeTextEntry3] : All medical record entries made by the Scribe were at my, Dr. Carranza's, discretion and personally dictated by me on 01/14/2020. I have reviewed the chart and agree that the record accurately reflects my personal performance of the history, physical exam, assessment and plan. I have also personally directed, reviewed and agreed to the chart.

## 2020-01-14 NOTE — HISTORY OF PRESENT ILLNESS
[de-identified] : Pt is a 68 y/o F s/p bypass surgery 5/5/16 who is s/p knee replacement presents today feeling well. Patient reports that she has gained 9 lb recently, partly due to traveling and the holidays. She states that she's been eating a lot of bread, bagels and raisins. She has been compliant with vitamins. Patient reports that her exercise is limited due to her recent knee replacement so she has been walking and is looking into buying a recumbent bike. Her rapid A1C level was measured at 7.3 last week.

## 2020-01-15 ENCOUNTER — RX RENEWAL (OUTPATIENT)
Age: 70
End: 2020-01-15

## 2020-01-15 LAB
25(OH)D3 SERPL-MCNC: 60.2 NG/ML
ALBUMIN SERPL ELPH-MCNC: 4.7 G/DL
ALP BLD-CCNC: 64 U/L
ALT SERPL-CCNC: 34 U/L
ANION GAP SERPL CALC-SCNC: 14 MMOL/L
AST SERPL-CCNC: 33 U/L
BILIRUB SERPL-MCNC: 0.4 MG/DL
BUN SERPL-MCNC: 16 MG/DL
CA-I SERPL-SCNC: 1.29 MMOL/L
CALCIUM SERPL-MCNC: 10.2 MG/DL
CALCIUM SERPL-MCNC: 10.2 MG/DL
CHLORIDE SERPL-SCNC: 100 MMOL/L
CHOLEST SERPL-MCNC: 184 MG/DL
CHOLEST/HDLC SERPL: 2 RATIO
CO2 SERPL-SCNC: 28 MMOL/L
CREAT SERPL-MCNC: 0.75 MG/DL
ESTIMATED AVERAGE GLUCOSE: 169 MG/DL
FOLATE SERPL-MCNC: >20 NG/ML
GLUCOSE SERPL-MCNC: 121 MG/DL
HBA1C MFR BLD HPLC: 7.5 %
HDLC SERPL-MCNC: 94 MG/DL
INR PPP: 1.03 RATIO
IRON SATN MFR SERPL: 25 %
IRON SERPL-MCNC: 70 UG/DL
LDLC SERPL CALC-MCNC: 71 MG/DL
PARATHYROID HORMONE INTACT: 71 PG/ML
POTASSIUM SERPL-SCNC: 4.5 MMOL/L
PREALB SERPL NEPH-MCNC: 26 MG/DL
PROT SERPL-MCNC: 7.1 G/DL
PT BLD: 11.7 SEC
SODIUM SERPL-SCNC: 141 MMOL/L
TIBC SERPL-MCNC: 284 UG/DL
TRIGL SERPL-MCNC: 93 MG/DL
TSH SERPL-ACNC: 0.83 UIU/ML
UIBC SERPL-MCNC: 214 UG/DL
VIT B12 SERPL-MCNC: 1224 PG/ML

## 2020-01-19 LAB
A-TOCOPHEROL VIT E SERPL-MCNC: 16.6 MG/L
BETA+GAMMA TOCOPHEROL SERPL-MCNC: 0.1 MG/L
VIT A SERPL-MCNC: 49.2 UG/DL
VIT B1 SERPL-MCNC: 207.3 NMOL/L

## 2020-01-30 ENCOUNTER — APPOINTMENT (OUTPATIENT)
Dept: HEART AND VASCULAR | Facility: CLINIC | Age: 70
End: 2020-01-30
Payer: MEDICARE

## 2020-01-30 VITALS
WEIGHT: 151.4 LBS | HEART RATE: 56 BPM | DIASTOLIC BLOOD PRESSURE: 70 MMHG | HEIGHT: 63 IN | SYSTOLIC BLOOD PRESSURE: 138 MMHG | BODY MASS INDEX: 26.82 KG/M2

## 2020-01-30 PROCEDURE — 99213 OFFICE O/P EST LOW 20 MIN: CPT

## 2020-01-30 NOTE — HISTORY OF PRESENT ILLNESS
[FreeTextEntry1] : Referred by: PCP: Dr. Kitchen\par \par 68 y/o female with h/o htn, niddm, obesity, near-syncope, hl, who presents for f/up today s/p robotic assisted lap. gastric bypass and supraumbilical hernia repair . \par \par last seen \par recent increase in hgba1c and weight gain\par Seen by endo and had thyroid u/s for nodules - showed increase size nodule recommended biopsy\par \par \par She denies cp, sob, palpitations, lh, syncope, edema, orthopnea, pnd. \par \par In  she had a normal echo and pharm nuclear stress test.\par She was unable to tolerate both lipitor, zocor due to side effects and did not want to pay high copay for crestor. She had been using pravastatin and tolerating it. \par \par \par PRIOR history:\par Episode of vasovagal with near-syncope -  at [facility] and taken to hospital for 3 days with unrevealing tests. \par \par \par \par ECHO: 14: normal lv ef 65%, normal wall motion, trivial pericardial effusion, no significant valvular disease\par \par Imelda-Nuclear: : no ekg changes, normal perfusion, normal ef 66% and wall motion\par \par \par \par \par \par \par PMH/PSH:\par s/p robotic assisted lap. gastric bypass and supraumbilical hernia repair \par Non-insulin Diabetes - 20 years\par glaucoma\par plantar fascittis\par s/p open janice/appendix - age 22\par s/p left knee replacement - 2011\par bilateral cataract surgery - \par right vitrectomy - \par HTN\par morbid obesity\par osteopenia\par HL\par vasovagal syncope - \par \par \par SH:\par retired  - prior job - worked for [employer]/\par quit tobacco  - smoked on/off 10 years\par quit etoh \par no drugs\par single\par no children\par lives alone\par from [place]\par \par ALL:\par aspirin -> retinal bleed\par codeine -> constipation\par latex -> contact dermatitis\par iv dye -> shortness of breath\par lipitor/zocor -> stomach pain\par \par \par \par \par MEDS:\par asa 81 mg daily\par pravastatin 20 mg qhs\par metformin 500 mg bid\par citracal\par mvi\par vit B12/B1\par flax seed\par fesol\par \par \par \par FH:\par mother -  breast cancer age 60\par father -  lung cancer age 76\par 1 brother - age 28, healthy\par

## 2020-01-30 NOTE — PHYSICAL EXAM
[General Appearance - Well Developed] : well developed [General Appearance - Well Nourished] : well nourished [General Appearance - In No Acute Distress] : no acute distress [Normal Conjunctiva] : the conjunctiva exhibited no abnormalities [Normal Oropharynx] : normal oropharynx [Normal Jugular Venous V Waves Present] : normal jugular venous V waves present [Respiration, Rhythm And Depth] : normal respiratory rhythm and effort [Auscultation Breath Sounds / Voice Sounds] : lungs were clear to auscultation bilaterally [Heart Rate And Rhythm] : heart rate and rhythm were normal [Heart Sounds] : normal S1 and S2 [Murmurs] : no murmurs present [Arterial Pulses Normal] : the arterial pulses were normal [Edema] : no peripheral edema present [Nail Clubbing] : no clubbing of the fingernails [Abnormal Walk] : normal gait [Cyanosis, Localized] : no localized cyanosis [] : no rash [Skin Lesions] : no skin lesions [Oriented To Time, Place, And Person] : oriented to person, place, and time [No Anxiety] : not feeling anxious

## 2020-01-30 NOTE — DISCUSSION/SUMMARY
[Patient] : the patient [___ Month(s)] : [unfilled] month(s) [FreeTextEntry1] : 68 y/o female with h/o htn, niddm, obesity, near-syncope, hl, s/p robotic assisted lap. gastric bypass and supraumbilical hernia repair here for f/up today\par \par -due for urine microalbumin - can check next visit\par -Labs 1/20 reviewed\par -ekg 6/19 - SB, normal intervals, nsra\par -Continue aspirin \par -Unable to tolerate lipitor and zocor and (crestor - did not want to pay high copay), will continue pravastatin 20 mg qhs \par -Strict blood sugar control for DM management, f/up with Endocrine and for thyroid nodule\par -Echo 8/14- nl ef/wall motion/no significant valuvlar disease\par -Adenosine nuclear stress test 8/14- normal perfusion, no ekg changes\par -F/up 6 months\par

## 2020-01-31 ENCOUNTER — APPOINTMENT (OUTPATIENT)
Dept: ENDOCRINOLOGY | Facility: CLINIC | Age: 70
End: 2020-01-31
Payer: MEDICARE

## 2020-01-31 VITALS
DIASTOLIC BLOOD PRESSURE: 58 MMHG | BODY MASS INDEX: 27.29 KG/M2 | HEART RATE: 57 BPM | HEIGHT: 63 IN | WEIGHT: 154 LBS | SYSTOLIC BLOOD PRESSURE: 135 MMHG

## 2020-01-31 PROCEDURE — 99214 OFFICE O/P EST MOD 30 MIN: CPT

## 2020-01-31 NOTE — HISTORY OF PRESENT ILLNESS
[FreeTextEntry1] : A1c went up to 7.5 but says she knows why it went up-- went on cruise to [place] in August, then vacation to [place] in October and she didn't like the food so was eating fast food.  Then had two deaths in the family, was eating more.  And then holiday parties for Xmas, Kwanzaa and new years.\par also has been exercising less but now is planning to exercise more. considering buying a bike from [employer]\par met with RD after seeing Dr Carranza to give her tips on how to get 60-80g of protein while limiting calories to 1000 keenan/day (Premier shakes containing 30g protein recommended, and increased exercise).  Says this is the first time since her bariatric surgery that she is gaining weight.\par no polyuria, polydipsia, SOB, chest pain, or neuropathy symptoms \par sees podiatry every 3 months\par up to date with ophtho\par \par PMH: diabetes, dx age 42.  s/p vitrectomy, laser\par s/p gastric bypass 5/16\par L knee replaced 2011\par s/p cholecystectomy and appendectomy\par \par Meds:\par metformin ER 500mg BID\par calcium citrate + Mg + Zinc (Fowler brand). 2 tab/day (2 tabs = 500mg calcium, 800 IU vitamin D, Mg 80mg, Zn 10mg)\par Centrum silver chewable, 2 per day\par B complex liquid, every 2 weeks.  vitamin B1 daily.\par FeSol, once a day. \par Benefiber. Colace every day.  Prilosec or Nexium (whichever one is on sale)\par lisinopril 2.5mg/day\par pravastatin 20mg\par timolol eye drops\par Zyrtec prn.\par

## 2020-01-31 NOTE — DATA REVIEWED
[FreeTextEntry1] : 1/20:A1c 7.5%,  tot chol 184, trig 93, HDL 94, LDL 71, Ca 10.2, PTH 71, TSH 0.83, 25D 60.2\par 4/19: A1c 7.7%, tot chol 158, trig 81, HDL 78, LDL 64, Ca 9.9, PTH 67, TSH 0.78, 25D 53.5\par 3/19 (per patient): A1c 7.2%\par 8/18: A1c 6.7%, tot chol 159, trig 87, HDL 71, LDL 71, Cr 0.75, urine microalbumin < 1.2, 25D 49.9\par 12/17: A1c 6.2%,  tot chol 160, HDL 97, LDL 50, trig 66, Hb 11.1, 25D 49.1, folate >20, Ca 10.0, PTH 64, TSH 0.77\par \par thyroid sono, 6/19: homogenous\par 2 R nodules, 4mm and 9mm, no suspicious features \par L upper nodule 11 x 5 x 9mm, hypoechoic, prev 9mm\par L nodule 4mm\par \par bone density, 7/18:\par L1-L4 T -0.8\par R fem neck T -1.1, L fem neck T -1.5\par R tot hip T -0.4; L tot hip T -0.7\par . \par \par

## 2020-01-31 NOTE — ASSESSMENT
[FreeTextEntry1] : Diabetes, suboptimal. A1c 7.5%, (increasing)\par Discussed adding second agent that could also help her lose weight, namely weekly GLP1 agonist or SGLT2 inhibitor.   She prefers not to do injections.  She is afraid of Invokana because of ads on TV with people suing makers of Invokana.  I mentioned that these medications also have shown cardiac safety and reduced cardiac deaths. \par For now, she is agreeable to increasing metformin dose, to 750ng bid and working on improving diet, increasing exercise.  if A1c is not improved next visit, she is willing to try SGLT2 inhibitor. Potential side effects  discussed: increased urination, increased risk of urinary/genital infection, low BP, dizziness. Advised to drink enough water (avoid dehydration) when taking SGLT2 inhibitor.\par \par Thyroid nodules.  Discussed results of last thyroid sono.  Largest nodule is 11mm, which grew only 2mm since last exam, in 2017, which within expected growth range (ie it is not growing rapidly).  I prefer to keep monitoring and not biopsying yet.  will wait until nodules is over 12mm before sending for FNA, unless is showing suspicious features (microcalcifications, irregular borders, hypervascularity).\par RTO 4 months

## 2020-01-31 NOTE — PHYSICAL EXAM
[Healthy Appearance] : healthy appearance [Alert] : alert [No Proptosis] : no proptosis [Normal Voice/Communication] : normal voice communication [Normal Hearing] : hearing was normal [No Lid Lag] : no lid lag [No LAD] : no lymphadenopathy [Thyroid Not Enlarged] : the thyroid was not enlarged [Clear to Auscultation] : lungs were clear to auscultation bilaterally [Normal S1, S2] : normal S1 and S2 [No Edema] : there was no peripheral edema [Regular Rhythm] : with a regular rhythm [Pedal Pulses Normal] : the pedal pulses are present [Normal Sensation on Monofilament Testing] : normal sensation on monofilament testing of lower extremities [Normal Mood] : the mood was normal [Normal Affect] : the affect was normal [Foot Ulcers] : no foot ulcers [de-identified] : thyroid palpable, nodula [de-identified] : reduced arches [de-identified] : no onychomycoses, reduced hair growth, mild acanthosis nigricans

## 2020-02-25 ENCOUNTER — APPOINTMENT (OUTPATIENT)
Dept: ENDOCRINOLOGY | Facility: CLINIC | Age: 70
End: 2020-02-25

## 2020-03-23 ENCOUNTER — APPOINTMENT (OUTPATIENT)
Dept: ENDOCRINOLOGY | Facility: CLINIC | Age: 70
End: 2020-03-23

## 2020-06-22 ENCOUNTER — RX RENEWAL (OUTPATIENT)
Age: 70
End: 2020-06-22

## 2020-07-22 ENCOUNTER — APPOINTMENT (OUTPATIENT)
Dept: ENDOCRINOLOGY | Facility: CLINIC | Age: 70
End: 2020-07-22
Payer: MEDICARE

## 2020-07-22 VITALS
WEIGHT: 157 LBS | HEART RATE: 54 BPM | BODY MASS INDEX: 27.81 KG/M2 | DIASTOLIC BLOOD PRESSURE: 67 MMHG | SYSTOLIC BLOOD PRESSURE: 108 MMHG

## 2020-07-22 PROCEDURE — 99214 OFFICE O/P EST MOD 30 MIN: CPT | Mod: 25

## 2020-07-22 PROCEDURE — 36415 COLL VENOUS BLD VENIPUNCTURE: CPT

## 2020-07-22 NOTE — HISTORY OF PRESENT ILLNESS
[FreeTextEntry1] : A1c was 7.8% with primary doctor 2 weeks ago (point of care fingerstick)\par Because of pandemic, is less active, napping more, snacking more.  not watching portions as diligently\par will see RD on July 28\par felt dizzy on the 750mg metformin dose, so went back to to 500mg. And then she got recall letter about her metformin.\par It has been too hot to walk lately but she was able to walk her for appointment (15 blocks)\par up to date with ophtho (saw a few months ago) and podiatry (goes every 3 months).  Also had mammogram recently.\par no polyuria, polydipsia, SOB, chest pain, or neuropathy symptoms \par usually is constipated, taking stool softeners, so she doesn't want to add medication that may increase constipation\par for past two days, doing following diet:  lunch around noon,  4pm protein shake, and fruit with yogurt around 7-8p and so far, lost 2 lb.\par \par PMH: diabetes, dx age 42.  s/p vitrectomy, laser\par s/p gastric bypass 5/16\par L knee replaced 2011\par s/p cholecystectomy and appendectomy\par \par Meds:\par metformin ER 500mg BID (got dizzy on 750mg dose)\par calcium citrate + Mg + Zinc (Fowler brand). 2 tab/day (2 tabs = 500mg calcium, 800 IU vitamin D, Mg 80mg, Zn 10mg)\par Centrum silver chewable, 2 per day\par B complex liquid, every 2 weeks.  vitamin B1 daily.\par FeSol, once a day. \par Benefiber. Colace every day.  Prilosec or Nexium (whichever one is on sale)\par pravastatin 20mg\par timolol eye drops\par Zyrtec prn.\par Previous meds: lisinopril (side effects?), prandin, metformin  (dizziness), ? Invokana

## 2020-07-22 NOTE — ASSESSMENT
[Diabetic Medications] : Risks and benefits of diabetic medications were discussed [FreeTextEntry1] : Diabetes, suboptimal. A1c 7.8%, (increasing)\par Discussed adding second agent.   She doesn't want injectable, and GLP1 agonist may also cause constipation so will avoid.   She is agreeable to trying Jardiance 10mg.  Potential side effects  discussed: increased urination, increased risk of urinary/genital infection, low BP, dizziness. Advised to drink enough water (avoid dehydration). Discussed cardiac and renal benefits associated with SGLT2 inhibitor.\par Discussed metformin recall:  metformin itself is not unsafe but some ER preparations contain impurities so have been voluntarily recalled.  The recall seems to have affected more 750mg metformin doses than 500mg ER doses.\par continue statin therapy.\par \par Thyroid nodules.  due for yearly sono.\par RTO 4 months [FreeTextEntry3] : SGLT2 inhibitor, metformin

## 2020-07-23 LAB
ALBUMIN SERPL ELPH-MCNC: 4.5 G/DL
ALP BLD-CCNC: 61 U/L
ALT SERPL-CCNC: 37 U/L
ANION GAP SERPL CALC-SCNC: 13 MMOL/L
AST SERPL-CCNC: 30 U/L
BILIRUB SERPL-MCNC: 0.5 MG/DL
BUN SERPL-MCNC: 18 MG/DL
CALCIUM SERPL-MCNC: 10.2 MG/DL
CHLORIDE SERPL-SCNC: 99 MMOL/L
CHOLEST SERPL-MCNC: 189 MG/DL
CHOLEST/HDLC SERPL: 2.2 RATIO
CO2 SERPL-SCNC: 27 MMOL/L
CREAT SERPL-MCNC: 0.79 MG/DL
CREAT SPEC-SCNC: 46 MG/DL
ESTIMATED AVERAGE GLUCOSE: 183 MG/DL
GLUCOSE SERPL-MCNC: 131 MG/DL
HBA1C MFR BLD HPLC: 8 %
HDLC SERPL-MCNC: 86 MG/DL
LDLC SERPL CALC-MCNC: 87 MG/DL
MICROALBUMIN 24H UR DL<=1MG/L-MCNC: <1.2 MG/DL
MICROALBUMIN/CREAT 24H UR-RTO: NORMAL MG/G
POTASSIUM SERPL-SCNC: 4.4 MMOL/L
PROT SERPL-MCNC: 6.7 G/DL
SODIUM SERPL-SCNC: 139 MMOL/L
TRIGL SERPL-MCNC: 82 MG/DL
TSH SERPL-ACNC: 0.56 UIU/ML

## 2020-07-27 ENCOUNTER — NON-APPOINTMENT (OUTPATIENT)
Age: 70
End: 2020-07-27

## 2020-07-27 ENCOUNTER — APPOINTMENT (OUTPATIENT)
Dept: HEART AND VASCULAR | Facility: CLINIC | Age: 70
End: 2020-07-27
Payer: MEDICARE

## 2020-07-27 VITALS
WEIGHT: 157 LBS | BODY MASS INDEX: 27.82 KG/M2 | DIASTOLIC BLOOD PRESSURE: 66 MMHG | TEMPERATURE: 97.6 F | SYSTOLIC BLOOD PRESSURE: 128 MMHG | HEART RATE: 52 BPM | HEIGHT: 63 IN

## 2020-07-27 PROCEDURE — 99214 OFFICE O/P EST MOD 30 MIN: CPT | Mod: 25

## 2020-07-27 PROCEDURE — 93000 ELECTROCARDIOGRAM COMPLETE: CPT

## 2020-07-27 NOTE — HISTORY OF PRESENT ILLNESS
[FreeTextEntry1] : Referred by: PCP: Dr. Kitchen\par \par 69 y/o female with h/o htn, niddm, obesity, near-syncope, hl, who presents for f/up today s/p robotic assisted lap. gastric bypass and supraumbilical hernia repair . \par \par last seen \par gained weight, exercising less and eating poorly lately - hgba1c went up to 8\par recommended for jardiance by endo\par \par Seen by endo and had thyroid u/s for nodules - showed increase size nodule recommended biopsy\par \par \par She denies cp, sob, palpitations, lh, syncope, edema, orthopnea, pnd. \par \par In  she had a normal echo and pharm nuclear stress test.\par She was unable to tolerate both lipitor, zocor due to side effects and did not want to pay high copay for crestor. She had been using pravastatin and tolerating it. \par \par \par PRIOR history:\par Episode of vasovagal with near-syncope -  at [facility] and taken to hospital for 3 days with unrevealing tests. \par \par \par \par ECHO: 14: normal lv ef 65%, normal wall motion, trivial pericardial effusion, no significant valvular disease\par \par Imelda-Nuclear: : no ekg changes, normal perfusion, normal ef 66% and wall motion\par \par \par \par \par \par \par PMH/PSH:\par s/p robotic assisted lap. gastric bypass and supraumbilical hernia repair \par Non-insulin Diabetes - 20 years\par glaucoma\par plantar fascittis\par s/p open janice/appendix - age 22\par s/p left knee replacement - 2011\par bilateral cataract surgery - \par right vitrectomy - \par HTN\par morbid obesity\par osteopenia\par HL\par vasovagal syncope - \par \par \par SH:\par retired  - prior job - worked for [employer]/\par quit tobacco  - smoked on/off 10 years\par quit etoh \par no drugs\par single\par no children\par lives alone\par from [place]\par \par ALL:\par aspirin -> retinal bleed\par codeine -> constipation\par latex -> contact dermatitis\par iv dye -> shortness of breath\par lipitor/zocor -> stomach pain\par \par \par \par \par MEDS:\par asa 81 mg daily\par pravastatin 20 mg qhs\par metformin 500 mg bid\par citracal\par mvi\par vit B12/B1\par omeprazole 40 mg qd\par \par \par \par FH:\par mother -  breast cancer age 60\par father -  lung cancer age 76\par 1 brother - age 28, healthy\par

## 2020-07-27 NOTE — PHYSICAL EXAM
[General Appearance - In No Acute Distress] : no acute distress [General Appearance - Well Nourished] : well nourished [General Appearance - Well Developed] : well developed

## 2020-07-27 NOTE — DISCUSSION/SUMMARY
[___ Month(s)] : [unfilled] month(s) [Patient] : the patient [FreeTextEntry1] : 71 y/o female with h/o htn, niddm, obesity, near-syncope, hl, s/p robotic assisted lap. gastric bypass and supraumbilical hernia repair here for f/up today\par \par \par -Labs 2020 reviewed\par -ekg ordered today - SB, normal intervals, nsra\par -Continue aspirin \par -Unable to tolerate lipitor and zocor and (crestor - did not want to pay high copay), will continue pravastatin 20 mg qhs \par -Strict blood sugar control for DM management, f/up with Endocrine and for thyroid nodule\par -on metformin\par -Echo 8/14- nl ef/wall motion/no significant valuvlar disease\par -Adenosine nuclear stress test 8/14- normal perfusion, no ekg changes\par -F/up 6 months\par

## 2020-07-28 ENCOUNTER — APPOINTMENT (OUTPATIENT)
Dept: SURGERY | Facility: CLINIC | Age: 70
End: 2020-07-28
Payer: MEDICARE

## 2020-07-28 VITALS
DIASTOLIC BLOOD PRESSURE: 74 MMHG | HEIGHT: 63 IN | HEART RATE: 50 BPM | OXYGEN SATURATION: 94 % | SYSTOLIC BLOOD PRESSURE: 124 MMHG | WEIGHT: 157.13 LBS | TEMPERATURE: 97.3 F | BODY MASS INDEX: 27.84 KG/M2

## 2020-07-28 PROCEDURE — 99213 OFFICE O/P EST LOW 20 MIN: CPT

## 2020-07-28 NOTE — HISTORY OF PRESENT ILLNESS
[de-identified] : Pt is a 69 y/o F s/p bypass surgery 5/5/16 who is s/p knee replacement presents today feeling well. Pt reports weight gain. She states that she has not been exercising and admits to overeating. She has been compliant with vitamins. Her A1C level was 8.0 last week.

## 2020-07-28 NOTE — ASSESSMENT
[FreeTextEntry1] : 71 y/o F s/p bypass surgery 5/5/16 presents for follow up. Doing okay but with some weight gain. I advised patient to cut back on sugar and carbohydrates in order to lower her A1C level. Patient will meet with nutrition. Patient will follow up in 3 months. \par

## 2020-07-28 NOTE — END OF VISIT
[FreeTextEntry3] : All medical record entries made by the Scribe were at my, Dr. Josehp Carranza, direction and personally dictated by me on 07/28/2020. I have reviewed the chart and agree that the record accurately reflects my personal performance of the history, physical exam, assessment and plan. I have also personally directed, reviewed, and agreed with the chart.

## 2020-07-28 NOTE — ADDENDUM
[FreeTextEntry1] : I, Genevieve Pichardo, acted solely as a scribe for Dr. Joseph Carranza on 07/28/2020.

## 2020-10-20 ENCOUNTER — APPOINTMENT (OUTPATIENT)
Dept: SURGERY | Facility: CLINIC | Age: 70
End: 2020-10-20
Payer: MEDICARE

## 2020-10-20 VITALS
BODY MASS INDEX: 27.02 KG/M2 | TEMPERATURE: 97.1 F | HEIGHT: 63 IN | WEIGHT: 152.5 LBS | DIASTOLIC BLOOD PRESSURE: 74 MMHG | SYSTOLIC BLOOD PRESSURE: 126 MMHG | HEART RATE: 54 BPM | OXYGEN SATURATION: 100 %

## 2020-10-20 PROCEDURE — 99213 OFFICE O/P EST LOW 20 MIN: CPT

## 2020-10-20 PROCEDURE — 99072 ADDL SUPL MATRL&STAF TM PHE: CPT

## 2020-10-20 NOTE — HISTORY OF PRESENT ILLNESS
[de-identified] : Pt is a 71 y/o F s/p bypass surgery 5/5/16 who is s/p knee replacement presents today feeling well. Pt states she had a bone density scan and was told she needs to eat more green, leafy vegetables and take vitamin D and calcium carbonate. She states that she is trying to lose some more weight. Pt's last A1C level was 8.0. \par \par

## 2020-10-20 NOTE — ASSESSMENT
[FreeTextEntry1] : Pt is a 69 y/o F s/p bypass surgery 5/5/16 who is s/p knee replacement presents today feeling well. I advised patient to continue with Citracal rather than take calcium carbonate. I reminded her to work on lowering her A1C level. She will follow up in January.\par \par

## 2020-10-20 NOTE — ADDENDUM
[FreeTextEntry1] : This note was written by Zully Constantino on 10/20/2020 acting as scribe for Dr. Carranza

## 2020-10-20 NOTE — END OF VISIT
[FreeTextEntry3] : All medical record entries made by the Scribe were at my, Dr. Carranza's, discretion and personally dictated by me on 10/20/2020. I have reviewed the chart and agree that the record accurately reflects my personal performance of the history, physical exam, assessment and plan. I have also personally directed, reviewed and agreed to the chart.

## 2020-11-19 ENCOUNTER — APPOINTMENT (OUTPATIENT)
Dept: ENDOCRINOLOGY | Facility: CLINIC | Age: 70
End: 2020-11-19
Payer: MEDICARE

## 2020-11-19 VITALS
DIASTOLIC BLOOD PRESSURE: 68 MMHG | WEIGHT: 159 LBS | SYSTOLIC BLOOD PRESSURE: 145 MMHG | BODY MASS INDEX: 28.17 KG/M2 | HEART RATE: 47 BPM

## 2020-11-19 LAB — HBA1C MFR BLD HPLC: 7.5

## 2020-11-19 PROCEDURE — 83036 HEMOGLOBIN GLYCOSYLATED A1C: CPT | Mod: QW

## 2020-11-19 PROCEDURE — 99214 OFFICE O/P EST MOD 30 MIN: CPT | Mod: 25

## 2020-11-19 NOTE — PHYSICAL EXAM
[Alert] : alert [Healthy Appearance] : healthy appearance [No Proptosis] : no proptosis [No Lid Lag] : no lid lag [Normal Hearing] : hearing was normal [Clear to Auscultation] : lungs were clear to auscultation bilaterally [Normal S1, S2] : normal S1 and S2 [Regular Rhythm] : with a regular rhythm [No Edema] : no peripheral edema [Pedal Pulses Normal] : the pedal pulses are present [Normal Sensation on Monofilament Testing] : normal sensation on monofilament testing of lower extremities [Normal Affect] : the affect was normal [Normal Mood] : the mood was normal [Foot Ulcers] : no foot ulcers [de-identified] : fingerstick 131.(coffee) [de-identified] : thyroid palpable.  L mid nodule palpable (small, movable) [de-identified] : dry skin.  no onychomycoses. mild acanthosis nigricans

## 2020-11-19 NOTE — HISTORY OF PRESENT ILLNESS
[FreeTextEntry1] : did not take Jardiance, was nervous about potential side effects\par BP higher due to eating turkey oropeza.  is on her last package and then she will stop.\par has been munching more due to staying inside most of the time\par not exercising.  activity limited by knee pain, says she cannot walk.  only can swim or do recumbent bike\par no polyuria, polydipsia, SOB, chest pain, or neuropathy symptoms \par She heard about a pill that will slow down stomach emptying and help insulin work better\par up to date with ophtho, goes every 3 months, and now was given 4mo follow up\par got flu vaccine\par \par PMH: diabetes, dx age 42.  s/p vitrectomy, laser\par s/p gastric bypass 5/16\par L knee replaced 2011\par s/p cholecystectomy and appendectomy\par \par Meds:\par metformin ER 500mg BID (got dizzy on 750mg dose)\par calcium citrate + Mg + Zinc (Fowler brand). 2 tab/day (2 tabs = 500mg calcium, 800 IU vitamin D, Mg 80mg, Zn 10mg)\par Centrum silver chewable, 2 per day\par B complex liquid, every 2 weeks.  vitamin B1 daily.\par FeSol, once a day. \par Benefiber. Colace every day.  Prilosec or Nexium (whichever one is on sale)\par pravastatin 20mg\par timolol eye drops\par Zyrtec prn.\par Previous meds: lisinopril (side effects?), prandin, metformin  (dizziness), ? Invokana

## 2020-11-19 NOTE — ASSESSMENT
[FreeTextEntry1] : Diabetes, suboptimal. A1c 7.5%\par I think she is talking about Rybelsus (semaglutide) and I am agreeable to trying this.  She does not want to do the injection (Ozempic).  Semaglutide also has shown to have cardiovascular benefits (SUSTAIN6, PIONEER). Instructions given on how to take Rybelsus: on empty stomach with 4oz of water, and delay eating by 30 minutes.  She says she can do this and would rather do this than a weekly injection.  Advised to eat slowly to prevent possible nausea,GERD side effects (says she normally eats slowly after her bariatric surgery).  samples given for 30 days of Rybelsus (lot [id number], exp 3/3022)\par continue metformin\par continue statin therapy.\par \par Thyroid nodules.  due for yearly sono, will need authorization.  will do at [facility]\par RTO 4 months

## 2020-11-19 NOTE — DATA REVIEWED
[FreeTextEntry1] : 11/20: A1c 7.5% (point of care)\par 7/20: A1c 8.0%, tot chol 189, trig 82, HDL 86, LDL 87, TSH 0.56, urine microalbumin < 1.2\par 1/20:A1c 7.5%,  tot chol 184, trig 93, HDL 94, LDL 71, Ca 10.2, PTH 71, TSH 0.83, 25D 60.2\par 4/19: A1c 7.7%, tot chol 158, trig 81, HDL 78, LDL 64, Ca 9.9, PTH 67, TSH 0.78, 25D 53.5\par 3/19 (per patient): A1c 7.2%\par 8/18: A1c 6.7%, tot chol 159, trig 87, HDL 71, LDL 71, Cr 0.75, urine microalbumin < 1.2, 25D 49.9\par 12/17: A1c 6.2%,  tot chol 160, HDL 97, LDL 50, trig 66, Hb 11.1, 25D 49.1, folate >20, Ca 10.0, PTH 64, TSH 0.77\par \par thyroid sono, 6/19: homogenous\par 2 R nodules, 4mm and 9mm, no suspicious features \par L upper nodule 11 x 5 x 9mm, hypoechoic, prev 9mm\par L nodule 4mm\par \par bone density, 7/18:\par L1-L4 T -0.8\par R fem neck T -1.1, L fem neck T -1.5\par R tot hip T -0.4; L tot hip T -0.7\par . \par \par

## 2020-12-16 ENCOUNTER — APPOINTMENT (OUTPATIENT)
Dept: ULTRASOUND IMAGING | Facility: HOSPITAL | Age: 70
End: 2020-12-16

## 2021-01-11 ENCOUNTER — OUTPATIENT (OUTPATIENT)
Dept: OUTPATIENT SERVICES | Facility: HOSPITAL | Age: 71
LOS: 1 days | End: 2021-01-11

## 2021-01-11 ENCOUNTER — APPOINTMENT (OUTPATIENT)
Dept: ULTRASOUND IMAGING | Facility: CLINIC | Age: 71
End: 2021-01-11
Payer: MEDICARE

## 2021-01-11 DIAGNOSIS — Z98.89 OTHER SPECIFIED POSTPROCEDURAL STATES: Chronic | ICD-10-CM

## 2021-01-11 DIAGNOSIS — Z90.49 ACQUIRED ABSENCE OF OTHER SPECIFIED PARTS OF DIGESTIVE TRACT: Chronic | ICD-10-CM

## 2021-01-11 DIAGNOSIS — Z96.652 PRESENCE OF LEFT ARTIFICIAL KNEE JOINT: Chronic | ICD-10-CM

## 2021-01-11 DIAGNOSIS — Z98.49 CATARACT EXTRACTION STATUS, UNSPECIFIED EYE: Chronic | ICD-10-CM

## 2021-01-11 PROCEDURE — 76536 US EXAM OF HEAD AND NECK: CPT | Mod: 26

## 2021-02-24 ENCOUNTER — APPOINTMENT (OUTPATIENT)
Dept: ENDOCRINOLOGY | Facility: CLINIC | Age: 71
End: 2021-02-24

## 2021-02-25 ENCOUNTER — RX RENEWAL (OUTPATIENT)
Age: 71
End: 2021-02-25

## 2021-03-15 ENCOUNTER — NON-APPOINTMENT (OUTPATIENT)
Age: 71
End: 2021-03-15

## 2021-03-15 ENCOUNTER — APPOINTMENT (OUTPATIENT)
Dept: HEART AND VASCULAR | Facility: CLINIC | Age: 71
End: 2021-03-15
Payer: MEDICARE

## 2021-03-15 VITALS
TEMPERATURE: 97 F | WEIGHT: 158 LBS | HEIGHT: 63 IN | DIASTOLIC BLOOD PRESSURE: 66 MMHG | HEART RATE: 56 BPM | BODY MASS INDEX: 28 KG/M2 | SYSTOLIC BLOOD PRESSURE: 128 MMHG

## 2021-03-15 PROCEDURE — 99072 ADDL SUPL MATRL&STAF TM PHE: CPT

## 2021-03-15 PROCEDURE — 93000 ELECTROCARDIOGRAM COMPLETE: CPT

## 2021-03-15 PROCEDURE — 99214 OFFICE O/P EST MOD 30 MIN: CPT | Mod: 25

## 2021-03-15 NOTE — HISTORY OF PRESENT ILLNESS
[FreeTextEntry1] : Referred by: PCP: Dr. Kitchen\par \par 70 y/o female with h/o htn, niddm, obesity, near-syncope, hl, who presents for f/up today s/p robotic assisted lap. gastric bypass and supraumbilical hernia repair . \par \par last seen \par started on rybelsus by endo\par will need thryoid biopsy soon\par \par \par She denies cp, sob, palpitations, lh, syncope, edema, orthopnea, pnd. \par \par \par \par In  she had a normal echo and pharm nuclear stress test.\par She was unable to tolerate both lipitor, zocor due to side effects and did not want to pay high copay for crestor. She had been using pravastatin and tolerating it. \par \par \par PRIOR history:\par Episode of vasovagal with near-syncope -  at [facility] and taken to hospital for 3 days with unrevealing tests. \par \par \par \par ECHO: 14: normal lv ef 65%, normal wall motion, trivial pericardial effusion, no significant valvular disease\par \par Imelda-Nuclear: : no ekg changes, normal perfusion, normal ef 66% and wall motion\par \par \par \par \par \par \par PMH/PSH:\par s/p robotic assisted lap. gastric bypass and supraumbilical hernia repair \par Non-insulin Diabetes - 20 years\par glaucoma\par plantar fascittis\par s/p open janice/appendix - age 22\par s/p left knee replacement - 2011\par bilateral cataract surgery - \par right vitrectomy - \par HTN\par morbid obesity\par osteopenia\par HL\par vasovagal syncope - \par \par \par SH:\par retired  - prior job - worked for [employer]par quit tobacco  - smoked on/off 10 years\par quit etoh \par no drugs\par single\par no children\par lives alone\par from [place]\par \par ALL:\par aspirin -> retinal bleed\par codeine -> constipation\par latex -> contact dermatitis\par iv dye -> shortness of breath\par lipitor/zocor -> stomach pain\par \par \par \par \par MEDS:\par asa 81 mg daily\par pravastatin 20 mg qhs\par metformin 500 mg bid\par citracal\par mvi\par vit B12/B1\par omeprazole 40 mg qd\par rybelsus 3 mg qd\par \par \par \par FH:\par mother -  breast cancer age 60\par father -  lung cancer age 76\par 1 brother - age 28, healthy\par

## 2021-03-15 NOTE — DISCUSSION/SUMMARY
[Patient] : the patient [___ Month(s)] : [unfilled] month(s) [FreeTextEntry1] : 70 y/o female with h/o htn, niddm, obesity, near-syncope, hl, s/p robotic assisted lap. gastric bypass and supraumbilical hernia repair here for f/up today\par \par -Labs 2020 reviewed\par -ordered ekg today - SB, normal intervals, nsra\par -Continue aspirin \par -Unable to tolerate lipitor and zocor and (crestor - did not want to pay high copay), will continue pravastatin 20 mg qhs \par -Strict blood sugar control for DM management, f/up with Endocrine and for thyroid nodule\par -on metformin, rybelsus\par -Echo 8/14- nl ef/wall motion/no significant valuvlar disease\par -Adenosine nuclear stress test 8/14- normal perfusion, no ekg changes\par -F/up 6 months for cvd risk factor management\par \par I have spent 30 minutes reviewing labs, records, tests and discussing cvd risk factor management\par

## 2021-04-12 ENCOUNTER — APPOINTMENT (OUTPATIENT)
Dept: SURGERY | Facility: CLINIC | Age: 71
End: 2021-04-12
Payer: MEDICARE

## 2021-04-12 VITALS
WEIGHT: 161 LBS | DIASTOLIC BLOOD PRESSURE: 59 MMHG | TEMPERATURE: 97.2 F | OXYGEN SATURATION: 98 % | BODY MASS INDEX: 28.53 KG/M2 | HEIGHT: 63 IN | HEART RATE: 58 BPM | SYSTOLIC BLOOD PRESSURE: 108 MMHG

## 2021-04-12 PROCEDURE — 99072 ADDL SUPL MATRL&STAF TM PHE: CPT

## 2021-04-12 PROCEDURE — 99214 OFFICE O/P EST MOD 30 MIN: CPT

## 2021-04-14 NOTE — HISTORY OF PRESENT ILLNESS
[de-identified] : This is a 72 y/o old female presenting to the office for evaluation and management of a sebaceous cyst. Referred by . Patient reports 1 week ago she noticed a lump in the right upper inner thigh. She reports it has increased in size since first noticed. She states she experiences 3/10 pain, especially when sitting. Denies any drainage from the lump. Denies fever, chills, n/v.

## 2021-04-14 NOTE — PHYSICAL EXAM
[Normal Breath Sounds] : Normal breath sounds [Normal Heart Sounds] : normal heart sounds [Normal Rate and Rhythm] : normal rate and rhythm [Alert] : alert [Oriented to Person] : oriented to person [Oriented to Place] : oriented to place [Oriented to Time] : oriented to time [Calm] : calm [Tender] : was nontender [Enlarged] : not enlarged [de-identified] : NAD, comfortable [de-identified] : Normocephalic, atraumatic. No scleral icterus.  [de-identified] : Supple, no JVD or cervical lymphadenopathy.   [de-identified] : No respiratory distress.  [de-identified] : +BS soft, nontender, nondistended. No overt abdominal mass. \par  [de-identified] : Warm, dry. There is a 2cm lesion on the right upper inner thigh with erythema. Tender to touch.

## 2021-04-14 NOTE — ASSESSMENT
[FreeTextEntry1] : Case discussed with attending, . 72 y/o female with a sebaceous cyst of the right upper inner thigh. Incision and drainage was performed in the office. The cyst was prepped with betadine. Local anesthesia of 1% lidocaine was infiltrated into the area of the mass. A #15 blade was used to make a cruciate incision in the skin. A culture swab was inserted into the area and used to break up the septations with noted extrusion of chunky material. The culture swab was sent for Gram Stain and culture. The incision was irrigated with a mixture of hydrogen peroxide and saline. The incision was packed with 1" plain packing and covered with dry gauze and tape. \par \par Patient tolerated procedure well. Started on antibiotics. Discussed proper wound care and patient expressed understanding. She will follow up in 1 week for a wound check. All questions answered.

## 2021-04-16 ENCOUNTER — APPOINTMENT (OUTPATIENT)
Dept: ENDOCRINOLOGY | Facility: CLINIC | Age: 71
End: 2021-04-16
Payer: MEDICARE

## 2021-04-16 VITALS
SYSTOLIC BLOOD PRESSURE: 117 MMHG | BODY MASS INDEX: 28.53 KG/M2 | HEART RATE: 59 BPM | WEIGHT: 161 LBS | DIASTOLIC BLOOD PRESSURE: 65 MMHG | HEIGHT: 63 IN

## 2021-04-16 PROCEDURE — 99072 ADDL SUPL MATRL&STAF TM PHE: CPT

## 2021-04-16 PROCEDURE — 36415 COLL VENOUS BLD VENIPUNCTURE: CPT

## 2021-04-16 PROCEDURE — 99214 OFFICE O/P EST MOD 30 MIN: CPT | Mod: 25

## 2021-04-16 RX ORDER — EMPAGLIFLOZIN 10 MG/1
10 TABLET, FILM COATED ORAL
Qty: 30 | Refills: 5 | Status: DISCONTINUED | COMMUNITY
Start: 2020-07-22 | End: 2021-04-16

## 2021-04-16 NOTE — HISTORY OF PRESENT ILLNESS
[FreeTextEntry1] : Tolerating Rybelsus and taking correctly. \par weight fluctuating during pandemic, less active than before\par currently taking antibiotics for infected cyst that was recently drained.  has follow up next week\par got second dose of Moderna vaccine on April 10\par no polyuria, polydipsia, SOB, chest pain, or neuropathy symptoms \par up to date with ophtho\par \par PMH: diabetes, dx age 42.  s/p vitrectomy, laser\par s/p gastric bypass 5/16\par L knee replaced 2011\par s/p cholecystectomy and appendectomy\par \par Meds:\par metformin ER 500mg BID (got dizzy on 750mg dose)\par calcium citrate + Mg + Zinc (Fowler brand). 2 tab/day (2 tabs = 500mg calcium, 800 IU vitamin D, Mg 80mg, Zn 10mg)\par Centrum silver chewable, 2 per day\par B complex liquid, every 2 weeks.  vitamin B1 daily.\par FeSol, once a day. \par Benefiber. Colace every day.  Prilosec or Nexium (whichever one is on sale)\par pravastatin 20mg\par timolol eye drops\par Zyrtec prn.\par Previous meds: lisinopril (side effects?), prandin, metformin  (dizziness), ? Invokana

## 2021-04-16 NOTE — PHYSICAL EXAM
[Alert] : alert [No Acute Distress] : no acute distress [No Proptosis] : no proptosis [No Lid Lag] : no lid lag [Normal Hearing] : hearing was normal [No LAD] : no lymphadenopathy [Clear to Auscultation] : lungs were clear to auscultation bilaterally [Normal S1, S2] : normal S1 and S2 [Regular Rhythm] : with a regular rhythm [Acanthosis Nigricans] : acanthosis nigricans present [Normal Affect] : the affect was normal [Normal Mood] : the mood was normal [de-identified] : fingerstick 136, post breakfast (Greek yogurt and blueberries) [de-identified] : L medial nodule palpable

## 2021-04-16 NOTE — DATA REVIEWED
[FreeTextEntry1] : 11/20: A1c 7.5% (point of care)\par 7/20: A1c 8.0%, tot chol 189, trig 82, HDL 86, LDL 87, TSH 0.56, urine microalbumin < 1.2\par 1/20:A1c 7.5%,  tot chol 184, trig 93, HDL 94, LDL 71, Ca 10.2, PTH 71, TSH 0.83, 25D 60.2\par 4/19: A1c 7.7%, tot chol 158, trig 81, HDL 78, LDL 64, Ca 9.9, PTH 67, TSH 0.78, 25D 53.5\par 3/19 (per patient): A1c 7.2%\par 8/18: A1c 6.7%, tot chol 159, trig 87, HDL 71, LDL 71, Cr 0.75, urine microalbumin < 1.2, 25D 49.9\par 12/17: A1c 6.2%,  tot chol 160, HDL 97, LDL 50, trig 66, Hb 11.1, 25D 49.1, folate >20, Ca 10.0, PTH 64, TSH 0.77\par \par thyroid sono, 1/21: \par L upper nodule 13 x 6 x 9mm, hypoechoic, prev 11 x 5 x 9mm\par R nodules, 5mm and 9mm\par \par thyroid sono, 6/19: homogenous\par 2 R nodules, 4mm and 9mm, no suspicious features \par L upper nodule 11 x 5 x 9mm, hypoechoic, prev 9mm\par L nodule 4mm\par \par bone density, 7/18:\par L1-L4 T -0.8\par R fem neck T -1.1, L fem neck T -1.5\par R tot hip T -0.4; L tot hip T -0.7\par . \par \par

## 2021-04-16 NOTE — ASSESSMENT
[FreeTextEntry1] : Diabetes,goal  A1c 7.5% and under.\par Continue Rybelsus, titrate to 7mg/day dose. continue metformin; check B12 today.\par continue statin therapy.\par \par Thyroid nodules.  L upper nodule slowly increasing in size, never biopsied in past\par Refer to Dr Rey for FNA\par RTO 4 months

## 2021-04-19 ENCOUNTER — APPOINTMENT (OUTPATIENT)
Dept: SURGERY | Facility: CLINIC | Age: 71
End: 2021-04-19
Payer: MEDICARE

## 2021-04-19 VITALS
DIASTOLIC BLOOD PRESSURE: 79 MMHG | SYSTOLIC BLOOD PRESSURE: 143 MMHG | TEMPERATURE: 96.8 F | HEIGHT: 63 IN | HEART RATE: 69 BPM | BODY MASS INDEX: 28.62 KG/M2 | WEIGHT: 161.5 LBS | OXYGEN SATURATION: 98 %

## 2021-04-19 LAB — BACTERIA SPEC CULT: ABNORMAL

## 2021-04-19 PROCEDURE — 99072 ADDL SUPL MATRL&STAF TM PHE: CPT

## 2021-04-19 PROCEDURE — 99212 OFFICE O/P EST SF 10 MIN: CPT

## 2021-04-20 LAB
ANION GAP SERPL CALC-SCNC: 12 MMOL/L
BUN SERPL-MCNC: 17 MG/DL
CALCIUM SERPL-MCNC: 10 MG/DL
CHLORIDE SERPL-SCNC: 99 MMOL/L
CHOLEST SERPL-MCNC: 187 MG/DL
CO2 SERPL-SCNC: 27 MMOL/L
CREAT SERPL-MCNC: 0.92 MG/DL
ESTIMATED AVERAGE GLUCOSE: 174 MG/DL
GLUCOSE SERPL-MCNC: 122 MG/DL
HBA1C MFR BLD HPLC: 7.7 %
HDLC SERPL-MCNC: 88 MG/DL
LDLC SERPL CALC-MCNC: 81 MG/DL
NONHDLC SERPL-MCNC: 99 MG/DL
POTASSIUM SERPL-SCNC: 5 MMOL/L
SODIUM SERPL-SCNC: 138 MMOL/L
TRIGL SERPL-MCNC: 89 MG/DL
VIT B12 SERPL-MCNC: 1024 PG/ML

## 2021-04-22 NOTE — PHYSICAL EXAM
[Normal Breath Sounds] : Normal breath sounds [Normal Heart Sounds] : normal heart sounds [Normal Rate and Rhythm] : normal rate and rhythm [Alert] : alert [Oriented to Person] : oriented to person [Oriented to Place] : oriented to place [Oriented to Time] : oriented to time [Calm] : calm [Tender] : was nontender [Enlarged] : not enlarged [de-identified] : NAD, comfortable [de-identified] : Normocephalic, atraumatic. No scleral icterus.  [de-identified] : Supple, no JVD or cervical lymphadenopathy.   [de-identified] : No respiratory distress.  [de-identified] : +BS soft, nontender, nondistended. No overt abdominal mass. \par  [de-identified] : Warm, dry. There is a 2cm lesion on the right upper inner thigh, indurated with erythema. I&D incision healing well. Tender to touch. No drainage.

## 2021-04-22 NOTE — ASSESSMENT
[FreeTextEntry1] : Case discussed with attending, . 72 y/o female with a sebaceous cyst of the right upper inner thigh. She is now 1 week s/p I&D. On exam, cyst is indurated and tender to touch. Reviewed culture and discussed continuation of antibiotics. Recommend wearing spandex/tight clothing to prevent rubbing and further infection. Recommended continuing showers 2x/day and keeping the area as clean as possible. Patient will RTC in 2 weeks for a follow up. All questions answered. Notably greater than 50% of today's 15 minute visit was spent on counseling and coordination of care. \par \par

## 2021-04-22 NOTE — HISTORY OF PRESENT ILLNESS
[de-identified] : This is a 72 y/o old female presenting to the office for evaluation and management of a sebaceous cyst. Referred by . Patient reports 1 week ago she noticed a lump in the right upper inner thigh. She reports it has increased in size since first noticed. She states she experiences 3/10 pain, especially when sitting. Denies any drainage from the lump. Denies fever, chills, n/v.  [de-identified] : 4-: Returns to office now s/p sebaceous cyst. Patient overall doing well. Reports she is still experiencing discomfort in the right upper inner thigh, but less compared to her last visit. Increased discomfort when sitting. Reports she finished full course of antibiotics and has been showering 2x/day. Denies drainage. Denies fever, chest pain, nausea/vomiting.

## 2021-05-03 ENCOUNTER — APPOINTMENT (OUTPATIENT)
Dept: SURGERY | Facility: CLINIC | Age: 71
End: 2021-05-03
Payer: MEDICARE

## 2021-05-03 VITALS
HEIGHT: 63 IN | BODY MASS INDEX: 28.44 KG/M2 | DIASTOLIC BLOOD PRESSURE: 73 MMHG | WEIGHT: 160.5 LBS | OXYGEN SATURATION: 97 % | TEMPERATURE: 97.2 F | HEART RATE: 62 BPM | SYSTOLIC BLOOD PRESSURE: 122 MMHG

## 2021-05-03 PROCEDURE — 99072 ADDL SUPL MATRL&STAF TM PHE: CPT

## 2021-05-03 PROCEDURE — 99213 OFFICE O/P EST LOW 20 MIN: CPT

## 2021-05-07 NOTE — PHYSICAL EXAM
[Normal Breath Sounds] : Normal breath sounds [Normal Heart Sounds] : normal heart sounds [Normal Rate and Rhythm] : normal rate and rhythm [Alert] : alert [Oriented to Person] : oriented to person [Oriented to Place] : oriented to place [Oriented to Time] : oriented to time [Calm] : calm [Tender] : was nontender [Enlarged] : not enlarged [de-identified] : NAD, comfortable [de-identified] : Normocephalic, atraumatic. No scleral icterus.  [de-identified] : Supple, no JVD or cervical lymphadenopathy.   [de-identified] : No respiratory distress.  [de-identified] : +BS soft, nontender, nondistended. No overt abdominal mass. \par  [de-identified] : Warm, dry. There is a 2cm lesion on the right upper inner thigh. No longer tender. Healing well. Exam with chaperone.

## 2021-05-07 NOTE — ASSESSMENT
[FreeTextEntry1] : Case discussed with attending, . 70 y/o female with a sebaceous cyst of the right upper inner thigh. Wishes to have this excised.We discussed the risk benefits and alternatives to excision of the right inner thigh cyst in detail including but not limited to bleeding, infection, death, disability, chronic pain, numbness, scarring, recurrence of lesion, need for additional procedures and other issues. Patient expressed understanding and wishes to proceed with surgery. All questions answered. Notably greater than 50% of today's 15 minute visit was spent on counseling and coordination of her care.

## 2021-05-07 NOTE — HISTORY OF PRESENT ILLNESS
[de-identified] : This is a 72 y/o old female presenting to the office for evaluation and management of a sebaceous cyst. Referred by . Patient reports 1 week ago she noticed a lump in the right upper inner thigh. She reports it has increased in size since first noticed. She states she experiences 3/10 pain, especially when sitting. Denies any drainage from the lump. Denies fever, chills, n/v.  [de-identified] : 4-: Returns to office now s/p sebaceous cyst. Patient overall doing well. Reports she is still experiencing discomfort in the right upper inner thigh, but less compared to her last visit. Increased discomfort when sitting. Reports she finished full course of antibiotics and has been showering 2x/day. Denies drainage. Denies fever, chest pain, nausea/vomiting. \par \par 5-: Patient returns to office. Feels much better.

## 2021-05-18 ENCOUNTER — APPOINTMENT (OUTPATIENT)
Dept: SURGERY | Facility: CLINIC | Age: 71
End: 2021-05-18
Payer: MEDICARE

## 2021-05-18 VITALS
WEIGHT: 159 LBS | SYSTOLIC BLOOD PRESSURE: 148 MMHG | BODY MASS INDEX: 28.17 KG/M2 | DIASTOLIC BLOOD PRESSURE: 81 MMHG | TEMPERATURE: 96.5 F | HEART RATE: 72 BPM | OXYGEN SATURATION: 98 % | HEIGHT: 63 IN

## 2021-05-18 PROCEDURE — 99213 OFFICE O/P EST LOW 20 MIN: CPT

## 2021-05-18 NOTE — ADDENDUM
[FreeTextEntry1] : This note was written by Zully Constantino on 05/18/2021 acting as scribe for Dr. Carranza

## 2021-05-18 NOTE — END OF VISIT
[FreeTextEntry3] : All medical record entries made by the Scribe were at my, Dr. Carranza's, discretion and personally dictated by me on 05/18/2021. I have reviewed the chart and agree that the record accurately reflects my personal performance of the history, physical exam, assessment and plan. I have also personally directed, reviewed and agreed to the chart.

## 2021-05-18 NOTE — HISTORY OF PRESENT ILLNESS
[de-identified] : Pt is a 72 y/o F s/p bypass surgery 5/5/16, s/p knee replacement presents today feeling well. Pt reports she is having surgery with Dr. Meeks soon in order to remove a cyst on her thigh. Pt reports she gained weight during COVID-19 but she is now losing weight and as she loses weight, her BP and A1C have been going down. Most recent A1C from 4/16/21 shows A1C of 7.7. She is on Metformin 500 mg twice a day and was recently started on Rybelsus. She reports regular BM.

## 2021-05-18 NOTE — ASSESSMENT
[FreeTextEntry1] : Pt is a 70 y/o F s/p bypass surgery 5/5/16, s/p knee replacement presents today feeling well. Most recent A1C 4/16/21 was 7.7. I encouraged pt to continue to work on lowering her blood sugar. She will follow up in 1 year.

## 2021-05-25 ENCOUNTER — APPOINTMENT (OUTPATIENT)
Dept: ENDOCRINOLOGY | Facility: CLINIC | Age: 71
End: 2021-05-25
Payer: MEDICARE

## 2021-05-25 VITALS
SYSTOLIC BLOOD PRESSURE: 123 MMHG | BODY MASS INDEX: 28 KG/M2 | WEIGHT: 158 LBS | HEART RATE: 54 BPM | DIASTOLIC BLOOD PRESSURE: 69 MMHG | HEIGHT: 63 IN

## 2021-05-25 DIAGNOSIS — E04.9 NONTOXIC GOITER, UNSPECIFIED: ICD-10-CM

## 2021-05-25 PROCEDURE — 76536 US EXAM OF HEAD AND NECK: CPT

## 2021-05-25 PROCEDURE — 99214 OFFICE O/P EST MOD 30 MIN: CPT | Mod: 25

## 2021-05-25 NOTE — ASSESSMENT
[FreeTextEntry1] : MNG\par r/b/a to thyroid biopsy, management of thyroid nodules reviewed. No present indication for FNA biopsy at this time. repeat US recommended in 8-12 months or earlier if clinically mandated. s/s of compressive goiter reviewed Verbalized understanding and agrees with treatment plan, will contact MD and seek emergency medical care if condition changes.\par

## 2021-05-25 NOTE — PROCEDURE
[FreeTextEntry1] : POC US of the thyroid: 5/25/2021, images stored on local hard drive\par \par Indication: MNG\par \par Comparison: 1/2021\par \par Findings:\par \par The thyroid parenchyma is homogenous, mostly isoechoic and exhibits normal mild vascularity throughout the gland.\par \par The right thyroid lobe again exhibits 2 sub centimeter cystic nodules without concerning features.\par \par The isthmus  exhibits no discernible nodules.\par \par The left thyroid lobe exhibits an upper pole cyst, measuring 1.3 x 0.6 x 1 cm in diameter. It is cystic > solid. It's borders are regular and sharp and it exhibits a 3 mm eccentric isoechoic solid component with no apparent calcifications.\par \par There are no distinct parathyroids identified on this examination.\par \par No concerning lymphadenopathy observed on bilateral neck examination.\par \par Impression:\par

## 2021-06-17 ENCOUNTER — APPOINTMENT (OUTPATIENT)
Dept: HEART AND VASCULAR | Facility: CLINIC | Age: 71
End: 2021-06-17
Payer: MEDICARE

## 2021-06-17 ENCOUNTER — NON-APPOINTMENT (OUTPATIENT)
Age: 71
End: 2021-06-17

## 2021-06-17 VITALS
HEART RATE: 59 BPM | BODY MASS INDEX: 28.7 KG/M2 | HEIGHT: 63 IN | OXYGEN SATURATION: 98 % | DIASTOLIC BLOOD PRESSURE: 70 MMHG | SYSTOLIC BLOOD PRESSURE: 124 MMHG | TEMPERATURE: 97.4 F | WEIGHT: 162 LBS

## 2021-06-17 DIAGNOSIS — Z01.818 ENCOUNTER FOR OTHER PREPROCEDURAL EXAMINATION: ICD-10-CM

## 2021-06-17 PROCEDURE — 93000 ELECTROCARDIOGRAM COMPLETE: CPT | Mod: NC

## 2021-06-17 PROCEDURE — 99214 OFFICE O/P EST MOD 30 MIN: CPT | Mod: 25

## 2021-06-17 PROCEDURE — 36415 COLL VENOUS BLD VENIPUNCTURE: CPT

## 2021-06-17 NOTE — PHYSICAL EXAM
[Normal] : well developed, well nourished, no acute distress [Well Developed] : well developed [Well Nourished] : well nourished [No Acute Distress] : no acute distress [Normal Conjunctiva] : normal conjunctiva [Normal Venous Pressure] : normal venous pressure [No Carotid Bruit] : no carotid bruit [Normal S1, S2] : normal S1, S2 [No Murmur] : no murmur [No Rub] : no rub [No Gallop] : no gallop [Clear Lung Fields] : clear lung fields [Good Air Entry] : good air entry [No Respiratory Distress] : no respiratory distress  [Soft] : abdomen soft [Non Tender] : non-tender [No Masses/organomegaly] : no masses/organomegaly [Normal Bowel Sounds] : normal bowel sounds [Normal Gait] : normal gait [No Edema] : no edema [No Cyanosis] : no cyanosis [No Clubbing] : no clubbing [No Varicosities] : no varicosities [No Rash] : no rash [No Skin Lesions] : no skin lesions [Moves all extremities] : moves all extremities [No Focal Deficits] : no focal deficits [Normal Speech] : normal speech [Alert and Oriented] : alert and oriented [Normal memory] : normal memory

## 2021-06-18 LAB
ALBUMIN SERPL ELPH-MCNC: 4.2 G/DL
ALP BLD-CCNC: 66 U/L
ALT SERPL-CCNC: 34 U/L
ANION GAP SERPL CALC-SCNC: 9 MMOL/L
APPEARANCE: CLEAR
APTT BLD: 41.9 SEC
AST SERPL-CCNC: 27 U/L
BACTERIA: NEGATIVE
BASOPHILS # BLD AUTO: 0.03 K/UL
BASOPHILS NFR BLD AUTO: 0.9 %
BILIRUB SERPL-MCNC: 0.3 MG/DL
BILIRUBIN URINE: NEGATIVE
BLOOD URINE: NEGATIVE
BUN SERPL-MCNC: 16 MG/DL
CALCIUM SERPL-MCNC: 10 MG/DL
CHLORIDE SERPL-SCNC: 101 MMOL/L
CHOLEST SERPL-MCNC: 174 MG/DL
CO2 SERPL-SCNC: 27 MMOL/L
COLOR: NORMAL
CREAT SERPL-MCNC: 0.77 MG/DL
CREAT SPEC-SCNC: 43 MG/DL
EOSINOPHIL # BLD AUTO: 0.09 K/UL
EOSINOPHIL NFR BLD AUTO: 2.7 %
ESTIMATED AVERAGE GLUCOSE: 163 MG/DL
GLUCOSE QUALITATIVE U: NEGATIVE
GLUCOSE SERPL-MCNC: 133 MG/DL
HBA1C MFR BLD HPLC: 7.3 %
HCT VFR BLD CALC: 39.2 %
HDLC SERPL-MCNC: 93 MG/DL
HGB BLD-MCNC: 11.6 G/DL
HYALINE CASTS: 0 /LPF
IMM GRANULOCYTES NFR BLD AUTO: 0 %
INR PPP: 1.02 RATIO
KETONES URINE: NEGATIVE
LDLC SERPL CALC-MCNC: 64 MG/DL
LEUKOCYTE ESTERASE URINE: ABNORMAL
LYMPHOCYTES # BLD AUTO: 1.13 K/UL
LYMPHOCYTES NFR BLD AUTO: 34 %
MAGNESIUM SERPL-MCNC: 2.3 MG/DL
MAN DIFF?: NORMAL
MCHC RBC-ENTMCNC: 23.2 PG
MCHC RBC-ENTMCNC: 29.6 GM/DL
MCV RBC AUTO: 78.2 FL
MICROALBUMIN 24H UR DL<=1MG/L-MCNC: <1.2 MG/DL
MICROALBUMIN/CREAT 24H UR-RTO: NORMAL MG/G
MICROSCOPIC-UA: NORMAL
MONOCYTES # BLD AUTO: 0.4 K/UL
MONOCYTES NFR BLD AUTO: 12 %
NEUTROPHILS # BLD AUTO: 1.67 K/UL
NEUTROPHILS NFR BLD AUTO: 50.4 %
NITRITE URINE: NEGATIVE
NONHDLC SERPL-MCNC: 81 MG/DL
PH URINE: 6.5
PLATELET # BLD AUTO: 252 K/UL
POTASSIUM SERPL-SCNC: 4.6 MMOL/L
PROT SERPL-MCNC: 6.7 G/DL
PROTEIN URINE: NEGATIVE
PT BLD: 12 SEC
RBC # BLD: 5.01 M/UL
RBC # FLD: 14.5 %
RED BLOOD CELLS URINE: 0 /HPF
SODIUM SERPL-SCNC: 137 MMOL/L
SPECIFIC GRAVITY URINE: 1.01
SQUAMOUS EPITHELIAL CELLS: 0 /HPF
TRIGL SERPL-MCNC: 86 MG/DL
TSH SERPL-ACNC: 1.16 UIU/ML
UROBILINOGEN URINE: NORMAL
WBC # FLD AUTO: 3.32 K/UL
WHITE BLOOD CELLS URINE: 1 /HPF

## 2021-06-25 ENCOUNTER — OUTPATIENT (OUTPATIENT)
Dept: OUTPATIENT SERVICES | Facility: HOSPITAL | Age: 71
LOS: 1 days | End: 2021-06-25
Payer: COMMERCIAL

## 2021-06-25 DIAGNOSIS — Z98.49 CATARACT EXTRACTION STATUS, UNSPECIFIED EYE: Chronic | ICD-10-CM

## 2021-06-25 DIAGNOSIS — Z98.89 OTHER SPECIFIED POSTPROCEDURAL STATES: Chronic | ICD-10-CM

## 2021-06-25 DIAGNOSIS — Z96.652 PRESENCE OF LEFT ARTIFICIAL KNEE JOINT: Chronic | ICD-10-CM

## 2021-06-25 DIAGNOSIS — Z90.49 ACQUIRED ABSENCE OF OTHER SPECIFIED PARTS OF DIGESTIVE TRACT: Chronic | ICD-10-CM

## 2021-06-25 PROCEDURE — 71046 X-RAY EXAM CHEST 2 VIEWS: CPT | Mod: 26

## 2021-06-25 PROCEDURE — 71046 X-RAY EXAM CHEST 2 VIEWS: CPT

## 2021-06-28 NOTE — DISCUSSION/SUMMARY
[Patient] : the patient [___ Month(s)] : in [unfilled] month(s) [FreeTextEntry1] : 70 y/o female with h/o htn, niddm, obesity, near-syncope, hl, s/p robotic assisted lap. gastric bypass and supraumbilical hernia repair here for f/up today and preop evaluation prior to right upper inner thigh cyst excision. \par \par -Labs 2020, 2021 reviewed and preop labs sent today\par -ekg ordered today - SB, normal intervals, nsra\par She is asymptomatic and can perform > 4 METS. She should continue her asa, pravastatin\par Her blood sugar control  and DM management - to continue with endo \par Her last Echo 8/14 showed nl ef/wall motion/no significant valvular disease\par Her prior Adenosine nuclear stress test 8/14 showed normal perfusion, no ekg changes\par -CXR was ordered today which was normal\par \par She is low risk for a low risk procedure and can proceed to the OR \par \par -F/up 6 months for cvd risk factor management\par I have spent 30 minutes reviewing labs, records, tests and discussing cvd risk factor management and preop evaluation\par \par

## 2021-06-28 NOTE — HISTORY OF PRESENT ILLNESS
[FreeTextEntry1] : Referred by: PCP: Dr. Kitchen\par \par 72 y/o female with h/o htn, niddm, obesity, near-syncope, hl, s/p robotic assisted lap. gastric bypass and supraumbilical hernia repair  who presents for f/up today and preop evaluation prior to right upper inner thigh cyst excision.\par \par had drainage sebaceous cyst  and abx course\par last seen \par \par She denies cp, sob, palpitations, lh, syncope, edema, orthopnea, pnd. \par \par Able to perform > 4 METS\par She has h/o stable DM, no h/o chf, rf, cva, IHD\par \par In  she had a normal echo and pharm nuclear stress test.\par She was unable to tolerate both lipitor, zocor due to side effects and did not want to pay high copay for crestor. She had been using pravastatin and tolerating it. \par \par \par PRIOR history:\par Episode of vasovagal with near-syncope -  at [facility] and taken to hospital for 3 days with unrevealing tests. \par \par ECHO: 14: normal lv ef 65%, normal wall motion, trivial pericardial effusion, no significant valvular disease\par \par Imelda-Nuclear: : no ekg changes, normal perfusion, normal ef 66% and wall motion\par \par \par \par \par \par \par PMH/PSH:\par s/p robotic assisted lap. gastric bypass and supraumbilical hernia repair \par Non-insulin Diabetes - 20 years\par glaucoma\par plantar fascittis\par s/p open janice/appendix - age 22\par s/p left knee replacement - 2011\par bilateral cataract surgery - \par right vitrectomy - \par HTN\par morbid obesity\par osteopenia\par HL\par vasovagal syncope - \par \par \par SH:\par retired  - prior job - worked for [employer]/\par quit tobacco  - smoked on/off 10 years\par quit etoh \par no drugs\par single\par no children\par lives alone\par from [place]\par \par ALL:\par aspirin -> retinal bleed\par codeine -> constipation\par latex -> contact dermatitis\par iv dye -> shortness of breath\par lipitor/zocor -> stomach pain\par \par \par \par \par MEDS:\par asa 81 mg daily\par pravastatin 20 mg qhs\par metformin 500 mg bid\par citracal\par mvi\par vit B12/B1\par omeprazole 40 mg qd\par rybelsus 7 mg qd\par \par \par \par FH:\par mother -  breast cancer age 60\par father -  lung cancer age 76\par 1 brother - age 28, healthy\par

## 2021-07-03 ENCOUNTER — LABORATORY RESULT (OUTPATIENT)
Age: 71
End: 2021-07-03

## 2021-07-06 ENCOUNTER — APPOINTMENT (OUTPATIENT)
Dept: SURGERY | Facility: AMBULATORY SURGERY CENTER | Age: 71
End: 2021-07-06

## 2021-07-06 ENCOUNTER — OUTPATIENT (OUTPATIENT)
Dept: OUTPATIENT SERVICES | Facility: HOSPITAL | Age: 71
LOS: 1 days | Discharge: HOME | End: 2021-07-06
Payer: MEDICARE

## 2021-07-06 ENCOUNTER — RESULT REVIEW (OUTPATIENT)
Age: 71
End: 2021-07-06

## 2021-07-06 DIAGNOSIS — Z98.89 OTHER SPECIFIED POSTPROCEDURAL STATES: Chronic | ICD-10-CM

## 2021-07-06 DIAGNOSIS — Z98.49 CATARACT EXTRACTION STATUS, UNSPECIFIED EYE: Chronic | ICD-10-CM

## 2021-07-06 DIAGNOSIS — Z96.652 PRESENCE OF LEFT ARTIFICIAL KNEE JOINT: Chronic | ICD-10-CM

## 2021-07-06 DIAGNOSIS — Z90.49 ACQUIRED ABSENCE OF OTHER SPECIFIED PARTS OF DIGESTIVE TRACT: Chronic | ICD-10-CM

## 2021-07-06 LAB — GLUCOSE BLDC GLUCOMTR-MCNC: 135 MG/DL — HIGH (ref 70–99)

## 2021-07-06 PROCEDURE — 88304 TISSUE EXAM BY PATHOLOGIST: CPT | Mod: 26

## 2021-07-06 PROCEDURE — 11423 EXC H-F-NK-SP B9+MARG 2.1-3: CPT

## 2021-07-06 NOTE — BRIEF OPERATIVE NOTE - NSICDXBRIEFPROCEDURE_GEN_ALL_CORE_FT
PROCEDURES:  Excision of benign skin lesion (excluding skin tags) of leg, 3.1 to 3.5cm 06-Jul-2021 14:46:12 right leg Augusta Knowles

## 2021-07-15 LAB — SURGICAL PATHOLOGY STUDY: SIGNIFICANT CHANGE UP

## 2021-07-19 ENCOUNTER — APPOINTMENT (OUTPATIENT)
Dept: SURGERY | Facility: CLINIC | Age: 71
End: 2021-07-19
Payer: MEDICARE

## 2021-07-19 VITALS
DIASTOLIC BLOOD PRESSURE: 76 MMHG | HEART RATE: 67 BPM | WEIGHT: 161 LBS | BODY MASS INDEX: 28.53 KG/M2 | HEIGHT: 63 IN | SYSTOLIC BLOOD PRESSURE: 135 MMHG | TEMPERATURE: 96.9 F | OXYGEN SATURATION: 99 %

## 2021-07-19 DIAGNOSIS — L72.3 SEBACEOUS CYST: ICD-10-CM

## 2021-07-19 PROCEDURE — 99024 POSTOP FOLLOW-UP VISIT: CPT

## 2021-07-19 NOTE — HISTORY OF PRESENT ILLNESS
[de-identified] : This is a 70 y/o old female presenting to the office for evaluation and management of a sebaceous cyst. Referred by . Patient reports 1 week ago she noticed a lump in the right upper inner thigh. She reports it has increased in size since first noticed. She states she experiences 3/10 pain, especially when sitting. Denies any drainage from the lump. Denies fever, chills, n/v.  [de-identified] : 4-: Returns to office now s/p sebaceous cyst. Patient overall doing well. Reports she is still experiencing discomfort in the right upper inner thigh, but less compared to her last visit. Increased discomfort when sitting. Reports she finished full course of antibiotics and has been showering 2x/day. Denies drainage. Denies fever, chest pain, nausea/vomiting. \par \par 5-: Patient returns to office. Feels much better. \par \par 7-: Patient returns to office today for a routine post operative visit. Pt is s/p excision of right thigh lesion. Overall doing well. Denies any pain or drainage to the area. Denies fever, chest pain, shortness of breath, n/v or constipation.

## 2021-07-19 NOTE — PHYSICAL EXAM
[Normal Breath Sounds] : Normal breath sounds [Normal Heart Sounds] : normal heart sounds [Normal Rate and Rhythm] : normal rate and rhythm [Alert] : alert [Oriented to Person] : oriented to person [Oriented to Place] : oriented to place [Oriented to Time] : oriented to time [Calm] : calm [Tender] : was nontender [Enlarged] : not enlarged [de-identified] : NAD, comfortable [de-identified] : Normocephalic, atraumatic. No scleral icterus.  [de-identified] : Supple, no JVD or cervical lymphadenopathy.   [de-identified] : No respiratory distress.  [de-identified] : +BS soft, nontender, nondistended. No overt abdominal mass. \par  [de-identified] : Warm, dry. Right upper thigh Incision healing well without surround erythema or induration.

## 2021-07-19 NOTE — DATA REVIEWED
[FreeTextEntry1] : Pathology report: Discussed and reviewed - Right thigh lesion, skin and subcutaneous tissue with dermal fibrosis.

## 2021-07-19 NOTE — ASSESSMENT
[FreeTextEntry1] : 72 y/o female with a sebaceous cyst of the right upper inner thigh. Now s/p excision of right thigh lesion (July 6th, 2021). Patient seems to be doing well post operatively and recovering as expected. Discussed gradual return to normal activity and natural scar maturation. All questions answered. Knows to call office with any questions or concerns. F/U PRN.

## 2021-07-21 ENCOUNTER — APPOINTMENT (OUTPATIENT)
Dept: BARIATRICS | Facility: CLINIC | Age: 71
End: 2021-07-21

## 2021-08-09 ENCOUNTER — APPOINTMENT (OUTPATIENT)
Dept: SURGERY | Facility: CLINIC | Age: 71
End: 2021-08-09

## 2021-08-20 ENCOUNTER — APPOINTMENT (OUTPATIENT)
Dept: ENDOCRINOLOGY | Facility: CLINIC | Age: 71
End: 2021-08-20
Payer: MEDICARE

## 2021-08-20 ENCOUNTER — NON-APPOINTMENT (OUTPATIENT)
Age: 71
End: 2021-08-20

## 2021-08-20 VITALS
BODY MASS INDEX: 28.53 KG/M2 | SYSTOLIC BLOOD PRESSURE: 111 MMHG | DIASTOLIC BLOOD PRESSURE: 65 MMHG | HEART RATE: 57 BPM | HEIGHT: 63 IN | WEIGHT: 161 LBS

## 2021-08-20 PROCEDURE — 99214 OFFICE O/P EST MOD 30 MIN: CPT

## 2021-08-20 RX ORDER — SULFAMETHOXAZOLE AND TRIMETHOPRIM 400; 80 MG/1; MG/1
400-80 TABLET ORAL DAILY
Qty: 5 | Refills: 0 | Status: DISCONTINUED | COMMUNITY
Start: 2021-04-12 | End: 2021-08-20

## 2021-08-20 RX ORDER — ORAL SEMAGLUTIDE 7 MG/1
7 TABLET ORAL
Qty: 30 | Refills: 5 | Status: DISCONTINUED | COMMUNITY
Start: 2020-11-19 | End: 2021-08-20

## 2021-08-20 RX ORDER — SULFAMETHOXAZOLE AND TRIMETHOPRIM 400; 80 MG/1; MG/1
400-80 TABLET ORAL TWICE DAILY
Qty: 10 | Refills: 0 | Status: DISCONTINUED | COMMUNITY
Start: 2021-04-19 | End: 2021-08-20

## 2021-08-20 NOTE — HISTORY OF PRESENT ILLNESS
[FreeTextEntry1] : stopped Rybelsus because it was causing leg and hand spasms (which have  resolved since discontinuation)\par had surgery to remove infected sebaceous cyst on inner thigh.  was on antibiotics for 2 weeks.\par was referred to hematology for elevated wbc, but by the time she got to appointment, wbc normalized.\par up to date with ophtho, saw last week, no DR\par will see podiatry next week; goes every 3 months to cut nails.  no neuropathy symptoms.\par vaccinated for Covid\par went for FNA of L thyroid nodule (FNA recommended on report) but was told nodule is mostly cystic, so did not need FNA\par \par PMH: diabetes, dx age 42.  s/p vitrectomy, laser\par s/p gastric bypass 5/16\par L knee replaced 2011\par s/p cholecystectomy and appendectomy\par \par Meds:\par metformin ER 500mg BID (got dizzy on 750mg dose)\par calcium citrate + Mg + Zinc (Fowler brand). 2 tab/day (2 tabs = 500mg calcium, 800 IU vitamin D, Mg 80mg, Zn 10mg)\par Centrum silver chewable, 2 per day\par B complex liquid, every 2 weeks.  vitamin B1 daily.\par FeSol, once a day. \par Benefiber. Colace every day.  Prilosec or Nexium (whichever one is on sale)\par pravastatin 20mg\par timolol eye drops\par Zyrtec prn.\par Previous meds: lisinopril (side effects?), prandin, metformin  (dizziness), ? Invokana, Rybelsus (muscle spasm)

## 2021-08-20 NOTE — ASSESSMENT
[FreeTextEntry1] : Diabetes, suboptimal. A1c 7.3%\par Titrate metformin to 3 tab/day (can take one with each meal).  She prefers this over adding another agent (I had suggested adding Januvia). \par continue statin therapy.\par \par Thyroid nodules.  get repeat sono January 2022.\par RTO 6 months

## 2021-08-20 NOTE — PHYSICAL EXAM
[Alert] : alert [No Acute Distress] : no acute distress [No Proptosis] : no proptosis [No Lid Lag] : no lid lag [Normal Hearing] : hearing was normal [No LAD] : no lymphadenopathy [Clear to Auscultation] : lungs were clear to auscultation bilaterally [Normal S1, S2] : normal S1 and S2 [Regular Rhythm] : with a regular rhythm [No Edema] : no peripheral edema [Pedal Pulses Normal] : the pedal pulses are present [Acanthosis Nigricans] : acanthosis nigricans present [Normal Sensation on Monofilament Testing] : normal sensation on monofilament testing of lower extremities [Normal Affect] : the affect was normal [Normal Mood] : the mood was normal [Foot Ulcers] : no foot ulcers [de-identified] : fingerstick 136, post breakfast (Greek yogurt and blueberries) [de-identified] : L medial nodule palpable (small, movable) [de-identified] : no onychomycoses, .mild acanthosis nigricans

## 2021-08-20 NOTE — DATA REVIEWED
[FreeTextEntry1] : 6/21: A1c 7.3%, tot chol 174, trig 86, HDL 93, LDL 64, urine microalbumin < 1.2, TSH 1.16\par 4/21: A1c 7.7%, tot chol 187, trig 89, HDL 88, LDL 81, B12 1024\par 11/20: A1c 7.5% (point of care)\par 7/20: A1c 8.0%, tot chol 189, trig 82, HDL 86, LDL 87, TSH 0.56, urine microalbumin < 1.2\par 1/20:A1c 7.5%,  tot chol 184, trig 93, HDL 94, LDL 71, Ca 10.2, PTH 71, TSH 0.83, 25D 60.2\par 4/19: A1c 7.7%, tot chol 158, trig 81, HDL 78, LDL 64, Ca 9.9, PTH 67, TSH 0.78, 25D 53.5\par 3/19 (per patient): A1c 7.2%\par 8/18: A1c 6.7%, tot chol 159, trig 87, HDL 71, LDL 71, Cr 0.75, urine microalbumin < 1.2, 25D 49.9\par 12/17: A1c 6.2%,  tot chol 160, HDL 97, LDL 50, trig 66, Hb 11.1, 25D 49.1, folate >20, Ca 10.0, PTH 64, TSH 0.77\par \par thyroid sono, 1/21: \par L upper nodule 13 x 6 x 9mm, hypoechoic, prev 11 x 5 x 9mm\par R nodules, 5mm and 9mm\par \par thyroid sono, 6/19: homogenous\par 2 R nodules, 4mm and 9mm, no suspicious features \par L upper nodule 11 x 5 x 9mm, hypoechoic, prev 9mm\par L nodule 4mm\par \par bone density, 7/18:\par L1-L4 T -0.8\par R fem neck T -1.1, L fem neck T -1.5\par R tot hip T -0.4; L tot hip T -0.7\par . \par \par

## 2021-08-23 ENCOUNTER — RX RENEWAL (OUTPATIENT)
Age: 71
End: 2021-08-23

## 2021-12-16 ENCOUNTER — APPOINTMENT (OUTPATIENT)
Dept: HEART AND VASCULAR | Facility: CLINIC | Age: 71
End: 2021-12-16
Payer: MEDICARE

## 2021-12-16 ENCOUNTER — NON-APPOINTMENT (OUTPATIENT)
Age: 71
End: 2021-12-16

## 2021-12-16 VITALS
HEIGHT: 63 IN | SYSTOLIC BLOOD PRESSURE: 132 MMHG | DIASTOLIC BLOOD PRESSURE: 63 MMHG | WEIGHT: 161 LBS | BODY MASS INDEX: 28.53 KG/M2 | TEMPERATURE: 97.6 F | OXYGEN SATURATION: 97 %

## 2021-12-16 VITALS — HEART RATE: 50 BPM

## 2021-12-16 PROCEDURE — 93000 ELECTROCARDIOGRAM COMPLETE: CPT

## 2021-12-16 PROCEDURE — 99214 OFFICE O/P EST MOD 30 MIN: CPT | Mod: 25

## 2021-12-16 NOTE — HISTORY OF PRESENT ILLNESS
[FreeTextEntry1] : Referred by: PCP: Dr. Kitchen\par \par 70 y/o female with h/o htn, niddm, obesity, near-syncope, hl, s/p robotic assisted lap. gastric bypass and supraumbilical hernia repair  who presents for f/up today \par \par last seen  preop evaluation prior to right upper inner thigh cyst excision.\par  \par \par She denies cp, sob, palpitations, lh, syncope, edema, orthopnea, pnd. \par \par Able to perform > 4 METS\par She has h/o stable DM, no h/o chf, rf, cva, IHD\par \par In  she had a normal echo and pharm nuclear stress test.\par She was unable to tolerate both lipitor, zocor due to side effects and did not want to pay high copay for crestor. She had been using pravastatin and tolerating it. \par \par \par PRIOR history:\par Episode of vasovagal with near-syncope -  at [facility] and taken to hospital for 3 days with unrevealing tests. \par \par ECHO: 14: normal lv ef 65%, normal wall motion, trivial pericardial effusion, no significant valvular disease\par \par Imelda-Nuclear: : no ekg changes, normal perfusion, normal ef 66% and wall motion\par \par \par \par \par \par \par PMH/PSH:\par s/p robotic assisted lap. gastric bypass and supraumbilical hernia repair \par Non-insulin Diabetes - 20 years\par glaucoma\par plantar fascittis\par s/p open janice/appendix - age 22\par s/p left knee replacement - 2011\par bilateral cataract surgery - \par right vitrectomy - \par HTN\par morbid obesity\par osteopenia\par HL\par vasovagal syncope - \par right upper inner thigh cyst excision \par  \par \par SH:\par retired  - prior job - worked for [email or web address]/\par quit tobacco  - smoked on/off 10 years\par quit etoh \par no drugs\par single\par no children\par lives alone\par from [place]\par \par ALL:\par aspirin -> retinal bleed\par codeine -> constipation\par latex -> contact dermatitis\par iv dye -> shortness of breath\par lipitor/zocor -> stomach pain\par \par \par \par \par MEDS:\par asa 81 mg daily\par pravastatin 20 mg qhs\par metformin 500 mg bid\par citracal\par mvi\par vit B12/B1\par omeprazole 40 mg qd\par  \par \par \par \par FH:\par mother -  breast cancer age 60\par father -  lung cancer age 76\par 1 brother - age 28, healthy

## 2021-12-16 NOTE — DISCUSSION/SUMMARY
[Patient] : the patient [___ Month(s)] : in [unfilled] month(s) [FreeTextEntry1] : 72 y/o female with h/o htn, niddm, obesity, near-syncope, hl, s/p robotic assisted lap. gastric bypass and supraumbilical hernia repair here for f/up today  \par  \par -labs 2021 reviewed\par -ekg ordered today - SB, normal intervals, nsra\par -continue her asa, pravastatin\par -blood sugar control and DM management - to continue with endo \par -Echo 8/14 showed nl ef/wall motion/no significant valvular disease\par -Adenosine nuclear stress test 8/14 showed normal perfusion, no ekg changes\par -f/up 6 months for htn, lipids\par  \par I have spent 30 minutes reviewing labs, records, tests and discussing cvd risk factor management and htn, hl\par

## 2022-02-07 ENCOUNTER — RX RENEWAL (OUTPATIENT)
Age: 72
End: 2022-02-07

## 2022-02-16 ENCOUNTER — APPOINTMENT (OUTPATIENT)
Dept: ENDOCRINOLOGY | Facility: CLINIC | Age: 72
End: 2022-02-16
Payer: MEDICARE

## 2022-02-16 VITALS
SYSTOLIC BLOOD PRESSURE: 133 MMHG | DIASTOLIC BLOOD PRESSURE: 78 MMHG | HEIGHT: 63 IN | WEIGHT: 162 LBS | HEART RATE: 64 BPM | BODY MASS INDEX: 28.7 KG/M2

## 2022-02-16 LAB — HBA1C MFR BLD HPLC: 7.9

## 2022-02-16 PROCEDURE — 99214 OFFICE O/P EST MOD 30 MIN: CPT

## 2022-02-16 PROCEDURE — 83036 HEMOGLOBIN GLYCOSYLATED A1C: CPT | Mod: QW

## 2022-02-16 NOTE — ASSESSMENT
[FreeTextEntry1] : Diabetes, suboptimal. A1c 7.9%\par Recommended adding Januvia 100mg.  Samples given, lot [id number], exp 9/23 (#14)\par continue metformin.  She will email or call me to send rx to pharmacy if she agrees to continue.\par continue statin therapy.\par \par Thyroid nodules.  send for repeat thyroid sonogram\par Send for repeat bone density (increased risk of bone loss after bariatric surgery)\par RTO 6 months

## 2022-02-16 NOTE — DATA REVIEWED
[FreeTextEntry1] : 2/22: A1c 7.9% POC\par 10/21: tot chol 172, HDL 92, trig 66, LDL65, urine alb/cr 4, TSH 0.95\par 6/21: A1c 7.3%, tot chol 174, trig 86, HDL 93, LDL 64, urine microalbumin < 1.2, TSH 1.16\par 4/21: A1c 7.7%, tot chol 187, trig 89, HDL 88, LDL 81, B12 1024\par 11/20: A1c 7.5% (point of care)\par 7/20: A1c 8.0%, tot chol 189, trig 82, HDL 86, LDL 87, TSH 0.56, urine microalbumin < 1.2\par 1/20:A1c 7.5%,  tot chol 184, trig 93, HDL 94, LDL 71, Ca 10.2, PTH 71, TSH 0.83, 25D 60.2\par 4/19: A1c 7.7%, tot chol 158, trig 81, HDL 78, LDL 64, Ca 9.9, PTH 67, TSH 0.78, 25D 53.5\par 3/19 (per patient): A1c 7.2%\par 8/18: A1c 6.7%, tot chol 159, trig 87, HDL 71, LDL 71, Cr 0.75, urine microalbumin < 1.2, 25D 49.9\par 12/17: A1c 6.2%,  tot chol 160, HDL 97, LDL 50, trig 66, Hb 11.1, 25D 49.1, folate >20, Ca 10.0, PTH 64, TSH 0.77\par \par thyroid sono, 1/21: \par L upper nodule 13 x 6 x 9mm, hypoechoic, prev 11 x 5 x 9mm\par R nodules, 5mm and 9mm\par \par thyroid sono, 6/19: homogenous\par 2 R nodules, 4mm and 9mm, no suspicious features \par L upper nodule 11 x 5 x 9mm, hypoechoic, prev 9mm\par L nodule 4mm\par \par bone density, 7/18:\par L1-L4 T -0.8\par R fem neck T -1.1, L fem neck T -1.5\par R tot hip T -0.4; L tot hip T -0.7\par . \par \par

## 2022-02-16 NOTE — HISTORY OF PRESENT ILLNESS
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 hand is stiffer and  is weaker\par weight stable.  not exercising as much as she would like\par sees ophtho every 3 months.  on timolol drops\par sees podiatry every 3 months\par no polyuria, polydipsia, SOB, chest pain, or neuropathy symptoms\par \par PMH: diabetes, dx age 42.  s/p vitrectomy, laser\par s/p gastric bypass 5/16\par L knee replaced 2011\par s/p cholecystectomy and appendectomy\par \par Meds:\par metformin ER 500mg, 3 tab/day\par calcium citrate + Mg + Zinc (Fowler brand). 2 tab/day (2 tabs = 500mg calcium, 800 IU vitamin D, Mg 80mg, Zn 10mg)\par Centrum silver chewable, 2 per day\par B complex liquid, every 2 weeks.  vitamin B1 daily.\par FeSol, once a day. \par Benefiber. Colace every day.  Prilosec or Nexium (whichever one is on sale)\par pravastatin 20mg\par timolol eye drops\par Zyrtec prn.\par Previous meds: lisinopril (side effects?), prandin, metformin  (dizziness), ? Invokana, Rybelsus (muscle spasm, stomach pains)

## 2022-02-16 NOTE — PHYSICAL EXAM
[Alert] : alert [No Acute Distress] : no acute distress [No Proptosis] : no proptosis [No Lid Lag] : no lid lag [Normal Hearing] : hearing was normal [No LAD] : no lymphadenopathy [Clear to Auscultation] : lungs were clear to auscultation bilaterally [Normal S1, S2] : normal S1 and S2 [Regular Rhythm] : with a regular rhythm [No Edema] : no peripheral edema [Pedal Pulses Normal] : the pedal pulses are present [Acanthosis Nigricans] : acanthosis nigricans present [Normal Sensation on Monofilament Testing] : normal sensation on monofilament testing of lower extremities [Normal Affect] : the affect was normal [Normal Mood] : the mood was normal [Foot Ulcers] : no foot ulcers [de-identified] : fingerstick 157,  coffee with milk and splenda [de-identified] : L medial nodule palpable (small, movable) [de-identified] : no onychomycoses, .mild acanthosis nigricans

## 2022-03-17 ENCOUNTER — OUTPATIENT (OUTPATIENT)
Dept: OUTPATIENT SERVICES | Facility: HOSPITAL | Age: 72
LOS: 1 days | End: 2022-03-17
Payer: MEDICARE

## 2022-03-17 ENCOUNTER — APPOINTMENT (OUTPATIENT)
Dept: ULTRASOUND IMAGING | Facility: HOSPITAL | Age: 72
End: 2022-03-17
Payer: MEDICARE

## 2022-03-17 ENCOUNTER — APPOINTMENT (OUTPATIENT)
Dept: RADIOLOGY | Facility: HOSPITAL | Age: 72
End: 2022-03-17
Payer: MEDICARE

## 2022-03-17 DIAGNOSIS — Z90.49 ACQUIRED ABSENCE OF OTHER SPECIFIED PARTS OF DIGESTIVE TRACT: Chronic | ICD-10-CM

## 2022-03-17 DIAGNOSIS — Z98.49 CATARACT EXTRACTION STATUS, UNSPECIFIED EYE: Chronic | ICD-10-CM

## 2022-03-17 DIAGNOSIS — Z96.652 PRESENCE OF LEFT ARTIFICIAL KNEE JOINT: Chronic | ICD-10-CM

## 2022-03-17 DIAGNOSIS — Z98.89 OTHER SPECIFIED POSTPROCEDURAL STATES: Chronic | ICD-10-CM

## 2022-03-17 PROCEDURE — 76536 US EXAM OF HEAD AND NECK: CPT | Mod: 26

## 2022-03-17 PROCEDURE — 77080 DXA BONE DENSITY AXIAL: CPT

## 2022-03-17 PROCEDURE — 76536 US EXAM OF HEAD AND NECK: CPT

## 2022-03-17 PROCEDURE — 77080 DXA BONE DENSITY AXIAL: CPT | Mod: 26

## 2022-03-22 DIAGNOSIS — E04.2 NONTOXIC MULTINODULAR GOITER: ICD-10-CM

## 2022-03-22 DIAGNOSIS — M85.859 OTHER SPECIFIED DISORDERS OF BONE DENSITY AND STRUCTURE, UNSPECIFIED THIGH: ICD-10-CM

## 2022-03-22 DIAGNOSIS — Z78.0 ASYMPTOMATIC MENOPAUSAL STATE: ICD-10-CM

## 2022-03-22 DIAGNOSIS — Z13.820 ENCOUNTER FOR SCREENING FOR OSTEOPOROSIS: ICD-10-CM

## 2022-03-22 DIAGNOSIS — E55.9 VITAMIN D DEFICIENCY, UNSPECIFIED: ICD-10-CM

## 2022-07-11 ENCOUNTER — APPOINTMENT (OUTPATIENT)
Dept: HEART AND VASCULAR | Facility: CLINIC | Age: 72
End: 2022-07-11

## 2022-07-11 ENCOUNTER — NON-APPOINTMENT (OUTPATIENT)
Age: 72
End: 2022-07-11

## 2022-07-11 VITALS
HEART RATE: 61 BPM | SYSTOLIC BLOOD PRESSURE: 134 MMHG | OXYGEN SATURATION: 98 % | WEIGHT: 165 LBS | TEMPERATURE: 97 F | BODY MASS INDEX: 29.23 KG/M2 | DIASTOLIC BLOOD PRESSURE: 76 MMHG | HEIGHT: 63 IN

## 2022-07-11 VITALS — SYSTOLIC BLOOD PRESSURE: 130 MMHG | DIASTOLIC BLOOD PRESSURE: 70 MMHG

## 2022-07-11 PROCEDURE — 99214 OFFICE O/P EST MOD 30 MIN: CPT | Mod: 25

## 2022-07-11 PROCEDURE — 93000 ELECTROCARDIOGRAM COMPLETE: CPT

## 2022-07-11 NOTE — DISCUSSION/SUMMARY
[Patient] : the patient [___ Month(s)] : in [unfilled] month(s) [EKG obtained to assist in diagnosis and management of assessed problem(s)] : EKG obtained to assist in diagnosis and management of assessed problem(s) [FreeTextEntry1] : 73 y/o female with h/o htn, niddm, obesity, near-syncope, hl, s/p robotic assisted lap. gastric bypass and supraumbilical hernia repair here for f/up today \par  \par -labs 2021 reviewed\par -ekg ordered today - SB, normal intervals, nsra\par -continue asa, pravastatin\par -blood sugar control and DM management - to continue with endo \par -Echo 8/14 showed nl ef/wall motion/no significant valvular disease\par -Adenosine nuclear stress test 8/14 showed normal perfusion, no ekg changes\par -f/up 6 months for htn \par  \par I have spent 30 minutes reviewing labs, records, tests and discussing cvd risk factor management and htn, hl\par  \par

## 2022-07-11 NOTE — HISTORY OF PRESENT ILLNESS
[FreeTextEntry1] : Referred by: PCP: Dr. Kitchen\par \par 71 y/o female with h/o htn, niddm, obesity, near-syncope, hl, s/p robotic assisted lap. gastric bypass and supraumbilical hernia repair  who presents for f/up today \par \par last seen \par  \par She denies cp, sob, palpitations, lh, syncope, edema, orthopnea, pnd. \par \par Able to perform > 4 METS\par She has h/o stable DM, no h/o chf, rf, cva, IHD\par \par In  she had a normal echo and pharm nuclear stress test.\par She was unable to tolerate both lipitor, zocor due to side effects and did not want to pay high copay for crestor. She had been using pravastatin and tolerating it. \par \par \par PRIOR history:\par Episode of vasovagal with near-syncope -  at [facility] and taken to hospital for 3 days with unrevealing tests. \par \par ECHO: 14: normal lv ef 65%, normal wall motion, trivial pericardial effusion, no significant valvular disease\par \par Imelda-Nuclear: : no ekg changes, normal perfusion, normal ef 66% and wall motion\par \par \par \par \par \par \par PMH/PSH:\par s/p robotic assisted lap. gastric bypass and supraumbilical hernia repair \par Non-insulin Diabetes - 20 years\par glaucoma\par plantar fascittis\par s/p open janice/appendix - age 22\par s/p left knee replacement - 2011\par bilateral cataract surgery - \par right vitrectomy - \par HTN\par morbid obesity\par osteopenia\par HL\par vasovagal syncope - \par right upper inner thigh cyst excision \par  \par \par SH:\par retired  - prior job - worked for [employer]/\par quit tobacco  - smoked on/off 10 years\par quit etoh \par no drugs\par single\par no children\par lives alone\par from [place]\par \par ALL:\par aspirin -> retinal bleed\par codeine -> constipation\par latex -> contact dermatitis\par iv dye -> shortness of breath\par lipitor/zocor -> stomach pain\par \par \par \par \par MEDS:\par asa 81 mg daily\par pravastatin 20 mg qhs\par metformin 500 mg bid\par citracal\par mvi\par vit B12/B1\par omeprazole 40 mg qd\par  \par \par \par \par FH:\par mother -  breast cancer age 60\par father -  lung cancer age 76\par 1 brother - age 28, healthy \par  \par

## 2022-09-01 ENCOUNTER — APPOINTMENT (OUTPATIENT)
Dept: ENDOCRINOLOGY | Facility: CLINIC | Age: 72
End: 2022-09-01

## 2022-09-01 VITALS
SYSTOLIC BLOOD PRESSURE: 144 MMHG | WEIGHT: 161 LBS | HEART RATE: 56 BPM | BODY MASS INDEX: 28.52 KG/M2 | DIASTOLIC BLOOD PRESSURE: 68 MMHG

## 2022-09-01 LAB — HBA1C MFR BLD HPLC: 7.3

## 2022-09-01 PROCEDURE — 99214 OFFICE O/P EST MOD 30 MIN: CPT | Mod: 25

## 2022-09-01 PROCEDURE — 83036 HEMOGLOBIN GLYCOSYLATED A1C: CPT | Mod: QW

## 2022-09-01 NOTE — DATA REVIEWED
[FreeTextEntry1] : 8/22  A1c 7.3% POC\par 2/22: A1c 7.9% POC\par 10/21: tot chol 172, HDL 92, trig 66, LDL65, urine alb/cr 4, TSH 0.95\par 6/21: A1c 7.3%, tot chol 174, trig 86, HDL 93, LDL 64, urine microalbumin < 1.2, TSH 1.16\par 4/21: A1c 7.7%, tot chol 187, trig 89, HDL 88, LDL 81, B12 1024\par 11/20: A1c 7.5% (point of care)\par 7/20: A1c 8.0%, tot chol 189, trig 82, HDL 86, LDL 87, TSH 0.56, urine microalbumin < 1.2\par 1/20:A1c 7.5%,  tot chol 184, trig 93, HDL 94, LDL 71, Ca 10.2, PTH 71, TSH 0.83, 25D 60.2\par 4/19: A1c 7.7%, tot chol 158, trig 81, HDL 78, LDL 64, Ca 9.9, PTH 67, TSH 0.78, 25D 53.5\par 3/19 (per patient): A1c 7.2%\par 8/18: A1c 6.7%, tot chol 159, trig 87, HDL 71, LDL 71, Cr 0.75, urine microalbumin < 1.2, 25D 49.9\par 12/17: A1c 6.2%,  tot chol 160, HDL 97, LDL 50, trig 66, Hb 11.1, 25D 49.1, folate >20, Ca 10.0, PTH 64, TSH 0.77\par \par thyroid sono, 2/22:\par L upper nodule 13 x 7 x 10mm\par R nodule 10 x 4 x 6mm\par other bilateral sub-cm nodules\par \par Previous sono:\par 1/21: L upper nodule 13 x 6 x 9mm, hypoechoic;  R mid  nodule 9mm\par 6/19: L nodule, 11 x 5 x 9mm, R mid nodule 9mm\par 417:  L nodule 9 x 5 x7mm.  R mid nodule 9mm\par 12/15  L upper nodule 8 x 4 x 7mm,  R mid nodule 8mm\par \par bone density, 3/22 \par L1-L4 T +0.3, prev  -0.8 (2018, diff place)\par L fem neck T -1.9, prev  -1.5 (2018)\par L tot hip T -0.7, prev  -0.7 (2018)\par . \par \par

## 2022-09-01 NOTE — HISTORY OF PRESENT ILLNESS
[FreeTextEntry1] : Did not try Januvia.\par weight is slowly creeping up.  She's not eating anything she's not supposed to\par She loses some weight but then hits a plateau.\par Doesn't want to try Trulicity, no needles\par not walking outside, afraid to go out.  She is also taking care of her disabled godmother.\par sees ophtho and podiatry every 3 months.  \par no polyuria, polydipsia, SOB, chest pain, or neuropathy symptoms\par \par PMH: diabetes, dx age 42.  s/p vitrectomy, laser\par s/p gastric bypass 5/16\par L knee replaced 2011\par s/p cholecystectomy and appendectomy\par \par Meds:\par metformin ER 500mg, 3 tab/day\par calcium citrate + Mg + Zinc (Fowler brand). 2 tab/day (2 tabs = 500mg calcium, 800 IU vitamin D, Mg 80mg, Zn 10mg)\par Centrum silver chewable, 2 per day\par B complex liquid, every 2 weeks.  vitamin B1 daily.\par FeSol, once a day. \par Benefiber. Colace every day.  Prilosec or Nexium (whichever one is on sale)\par pravastatin 20mg\par timolol eye drops\par Zyrtec prn.\par Previous meds: lisinopril (side effects?), prandin, metformin  (dizziness), ? Invokana, Rybelsus (muscle spasm, stomach pains)

## 2022-09-01 NOTE — PHYSICAL EXAM
[Alert] : alert [No Acute Distress] : no acute distress [No Proptosis] : no proptosis [No Lid Lag] : no lid lag [Normal Hearing] : hearing was normal [No LAD] : no lymphadenopathy [Clear to Auscultation] : lungs were clear to auscultation bilaterally [Normal S1, S2] : normal S1 and S2 [Regular Rhythm] : with a regular rhythm [No Edema] : no peripheral edema [Pedal Pulses Normal] : the pedal pulses are present [Acanthosis Nigricans] : acanthosis nigricans present [Foot Ulcers] : no foot ulcers [Normal Sensation on Monofilament Testing] : normal sensation on monofilament testing of lower extremities [Normal Affect] : the affect was normal [Normal Mood] : the mood was normal [de-identified] : L medial nodule palpable (small, movable) [de-identified] : no onychomycoses, .mild acanthosis nigricans.  Dark toenail (dropped something on it)

## 2022-09-07 ENCOUNTER — RX RENEWAL (OUTPATIENT)
Age: 72
End: 2022-09-07

## 2023-01-09 ENCOUNTER — NON-APPOINTMENT (OUTPATIENT)
Age: 73
End: 2023-01-09

## 2023-01-09 ENCOUNTER — APPOINTMENT (OUTPATIENT)
Dept: HEART AND VASCULAR | Facility: CLINIC | Age: 73
End: 2023-01-09
Payer: MEDICARE

## 2023-01-09 VITALS
WEIGHT: 160 LBS | DIASTOLIC BLOOD PRESSURE: 76 MMHG | BODY MASS INDEX: 28.35 KG/M2 | HEIGHT: 63 IN | HEART RATE: 57 BPM | SYSTOLIC BLOOD PRESSURE: 120 MMHG

## 2023-01-09 PROCEDURE — 99214 OFFICE O/P EST MOD 30 MIN: CPT | Mod: 25

## 2023-01-09 PROCEDURE — 93000 ELECTROCARDIOGRAM COMPLETE: CPT

## 2023-01-09 NOTE — DISCUSSION/SUMMARY
[Patient] : the patient [___ Month(s)] : in [unfilled] month(s) [FreeTextEntry1] : 73 y/o female with h/o htn, niddm, obesity, near-syncope, hl, s/p robotic assisted lap. gastric bypass and supraumbilical hernia repair here for f/up today \par  \par -labs 2021 reviewed, labs from pcp to reviewed\par -ekg ordered today - SB, normal intervals, nsra\par -continue asa, pravastatin\par -blood sugar control and DM management - to continue with endo \par -Echo 8/14 showed nl ef/wall motion/no significant valvular disease\par -Adenosine nuclear stress test 8/14 showed normal perfusion, no ekg changes\par -f/up 6 months for htn \par  \par I have spent 30 minutes reviewing labs, records, tests and discussing cvd risk factor management and htn, hl\par  [EKG obtained to assist in diagnosis and management of assessed problem(s)] : EKG obtained to assist in diagnosis and management of assessed problem(s)

## 2023-01-09 NOTE — PHYSICAL EXAM
[Well Developed] : well developed [Well Nourished] : well nourished [No Acute Distress] : no acute distress [Normal S1, S2] : normal S1, S2 [No Murmur] : no murmur [No Rub] : no rub [Normal] : clear lung fields, good air entry, no respiratory distress [Clear Lung Fields] : clear lung fields [Good Air Entry] : good air entry [No Respiratory Distress] : no respiratory distress

## 2023-01-09 NOTE — HISTORY OF PRESENT ILLNESS
[FreeTextEntry1] : 71 y/o female with h/o htn, niddm, obesity, near-syncope, hl, s/p robotic assisted lap. gastric bypass and supraumbilical hernia repair  who presents for f/up today \par \par last seen \par  \par She denies cp, sob, palpitations, lh, syncope, edema, orthopnea, pnd. \par \par Able to perform > 4 METS\par She has h/o stable DM, no h/o chf, rf, cva, IHD\par \par In  she had a normal echo and pharm nuclear stress test.\par She was unable to tolerate both lipitor, zocor due to side effects and did not want to pay high copay for crestor. She had been using pravastatin and tolerating it. \par \par \par PRIOR history:\par Episode of vasovagal with near-syncope -  at [facility] and taken to hospital for 3 days with unrevealing tests. \par \par ECHO: 14: normal lv ef 65%, normal wall motion, trivial pericardial effusion, no significant valvular disease\par \par Imelda-Nuclear: : no ekg changes, normal perfusion, normal ef 66% and wall motion\par \par \par \par \par \par \par PMH/PSH:\par s/p robotic assisted lap. gastric bypass and supraumbilical hernia repair \par Non-insulin Diabetes - 20 years\par glaucoma\par plantar fascittis\par s/p open janice/appendix - age 22\par s/p left knee replacement - 2011\par bilateral cataract surgery - \par right vitrectomy - \par HTN\par morbid obesity\par osteopenia\par HL\par vasovagal syncope - \par right upper inner thigh cyst excision \par  \par \par SH:\par retired  - prior job - worked for [employer]/\par quit tobacco  - smoked on/off 10 years\par quit etoh \par no drugs\par single\par no children\par lives alone\par from [place]\par \par ALL:\par aspirin -> retinal bleed\par codeine -> constipation\par latex -> contact dermatitis\par iv dye -> shortness of breath\par lipitor/zocor -> stomach pain\par \par \par \par \par MEDS:\par asa 81 mg daily\par pravastatin 20 mg qhs\par metformin 500 mg tid\par citracal\par mvi\par vit B12/B1\par omeprazole 40 mg qd\par  \par \par \par \par FH:\par mother -  breast cancer age 60\par father -  lung cancer age 76\par 1 brother - age 28, healthy \par

## 2023-04-27 ENCOUNTER — RX RENEWAL (OUTPATIENT)
Age: 73
End: 2023-04-27

## 2023-05-04 ENCOUNTER — RX RENEWAL (OUTPATIENT)
Age: 73
End: 2023-05-04

## 2023-05-04 RX ORDER — METFORMIN ER 500 MG 500 MG/1
500 TABLET ORAL
Qty: 270 | Refills: 1 | Status: ACTIVE | COMMUNITY
Start: 2018-04-30 | End: 1900-01-01

## 2023-08-16 ENCOUNTER — APPOINTMENT (OUTPATIENT)
Dept: HEART AND VASCULAR | Facility: CLINIC | Age: 73
End: 2023-08-16
Payer: MEDICARE

## 2023-08-16 VITALS
WEIGHT: 168 LBS | DIASTOLIC BLOOD PRESSURE: 62 MMHG | HEART RATE: 62 BPM | HEIGHT: 63 IN | BODY MASS INDEX: 29.77 KG/M2 | SYSTOLIC BLOOD PRESSURE: 108 MMHG

## 2023-08-16 PROCEDURE — 36415 COLL VENOUS BLD VENIPUNCTURE: CPT

## 2023-08-16 PROCEDURE — 99214 OFFICE O/P EST MOD 30 MIN: CPT | Mod: 25

## 2023-08-16 PROCEDURE — 93000 ELECTROCARDIOGRAM COMPLETE: CPT

## 2023-08-16 NOTE — DISCUSSION/SUMMARY
[Patient] : the patient [___ Month(s)] : in [unfilled] month(s) [EKG obtained to assist in diagnosis and management of assessed problem(s)] : EKG obtained to assist in diagnosis and management of assessed problem(s) [FreeTextEntry1] : 74 y/o female with h/o htn, niddm, obesity, near-syncope, hl, s/p robotic assisted lap. gastric bypass and supraumbilical hernia repair here for f/up today    -labs 2021 reviewed, labs ordered today  -ekg ordered today - SB, normal intervals, nsra  -continue asa, pravastatin  -blood sugar control and DM management - to continue with endo  -echo ordered today  -calcium score ordered   -Echo 8/14 showed nl ef/wall motion/no significant valvular disease  -Adenosine nuclear stress test 8/14 showed normal perfusion, no ekg changes  -f/up 6 months for htn    I have spent 30 minutes reviewing labs, records, tests and discussing cvd risk factor management and htn, hl .

## 2023-08-16 NOTE — HISTORY OF PRESENT ILLNESS
[FreeTextEntry1] : 72 y/o female with h/o htn, niddm, obesity, near-syncope, hl, s/p robotic assisted lap. gastric bypass and supraumbilical hernia repair  who presents for f/up today    last seen     She denies cp, sob, palpitations, lh, syncope, edema, orthopnea, pnd.    Able to perform > 4 METS  She has h/o stable DM, no h/o chf, rf, cva, IHD    In  she had a normal echo and pharm nuclear stress test.  She was unable to tolerate both lipitor, zocor due to side effects and did not want to pay high copay for crestor. She had been using pravastatin and tolerating it.      PRIOR history:  Episode of vasovagal with near-syncope -  at [facility] and taken to hospital for 3 days with unrevealing tests.    ECHO: 14: normal lv ef 65%, normal wall motion, trivial pericardial effusion, no significant valvular disease    Imelda-Nuclear: : no ekg changes, normal perfusion, normal ef 66% and wall motion              PMH/PSH:  s/p robotic assisted lap. gastric bypass and supraumbilical hernia repair  Non-insulin Diabetes - 20 years  glaucoma  plantar fascittis  s/p open janice/appendix - age 22  s/p left knee replacement - 2011  bilateral cataract surgery -   right vitrectomy -   HTN  morbid obesity  osteopenia  HL  vasovagal syncope -   right upper inner thigh cyst excision       SH:  retired  - prior job - worked for [employer]/  quit tobacco  - smoked on/off 10 years  quit etoh   no drugs  single  no children  lives alone  from [place]    ALL:  aspirin -> retinal bleed  codeine -> constipation  latex -> contact dermatitis  iv dye -> shortness of breath  lipitor/zocor -> stomach pain          MEDS:  asa 81 mg daily  pravastatin 20 mg qhs  metformin 500 mg tid  citracal  mvi  vit B12/B1  omeprazole 40 mg qd          FH:  mother -  breast cancer age 60  father -  lung cancer age 76  1 brother - age 28, healthy

## 2023-08-18 LAB
ALBUMIN SERPL ELPH-MCNC: 4.5 G/DL
ALP BLD-CCNC: 73 U/L
ALT SERPL-CCNC: 28 U/L
ANION GAP SERPL CALC-SCNC: 11 MMOL/L
APPEARANCE: CLEAR
AST SERPL-CCNC: 29 U/L
BACTERIA: NEGATIVE /HPF
BILIRUB SERPL-MCNC: 0.3 MG/DL
BILIRUBIN URINE: NEGATIVE
BLOOD URINE: NEGATIVE
BUN SERPL-MCNC: 20 MG/DL
CALCIUM SERPL-MCNC: 9.7 MG/DL
CAST: 0 /LPF
CHLORIDE SERPL-SCNC: 98 MMOL/L
CHOLEST SERPL-MCNC: 200 MG/DL
CO2 SERPL-SCNC: 25 MMOL/L
COLOR: YELLOW
CREAT SERPL-MCNC: 1.05 MG/DL
EGFR: 57 ML/MIN/1.73M2
EPITHELIAL CELLS: 0 /HPF
ESTIMATED AVERAGE GLUCOSE: 192 MG/DL
GLUCOSE QUALITATIVE U: NEGATIVE MG/DL
GLUCOSE SERPL-MCNC: 124 MG/DL
HBA1C MFR BLD HPLC: 8.3 %
HDLC SERPL-MCNC: 105 MG/DL
KETONES URINE: NEGATIVE MG/DL
LDLC SERPL CALC-MCNC: 80 MG/DL
LEUKOCYTE ESTERASE URINE: NEGATIVE
MAGNESIUM SERPL-MCNC: 1.9 MG/DL
MICROSCOPIC-UA: NORMAL
NITRITE URINE: NEGATIVE
NONHDLC SERPL-MCNC: 95 MG/DL
PH URINE: 6
POTASSIUM SERPL-SCNC: 5.3 MMOL/L
PROT SERPL-MCNC: 7.4 G/DL
PROTEIN URINE: NEGATIVE MG/DL
RED BLOOD CELLS URINE: 1 /HPF
SODIUM SERPL-SCNC: 135 MMOL/L
SPECIFIC GRAVITY URINE: 1.01
TRIGL SERPL-MCNC: 87 MG/DL
TSH SERPL-ACNC: 1.37 UIU/ML
UROBILINOGEN URINE: 0.2 MG/DL
WHITE BLOOD CELLS URINE: 0 /HPF

## 2023-09-20 ENCOUNTER — APPOINTMENT (OUTPATIENT)
Dept: ENDOCRINOLOGY | Facility: CLINIC | Age: 73
End: 2023-09-20

## 2023-10-09 NOTE — HISTORY OF PRESENT ILLNESS
[facility] accepted and contacted the pt on 10/6/23  to schedule the start of care.      Neville Karimi       [FreeTextEntry1] : Referred by: PCP: Dr. Kitchen\par \par 67 y/o female with h/o htn, niddm, obesity, near-syncope, hl, who presents for f/up today s/p robotic assisted lap. gastric bypass and supraumbilical hernia repair . \par \par \par  Mild LFT elevation noted on recent labs. Had MRI abdomen\par Feeling well and walking daily. \par \par She denies cp, sob, palpitations, lh, syncope, edema, orthopnea, pnd. \par \par In  she had a normal echo and pharm nuclear stress test.\par She was unable to tolerate both lipitor, zocor due to side effects and did not want to pay high copay for crestor. She had been using pravastatin and tolerating it. \par \par \par PRIOR history:\par Episode of vasovagal with near-syncope -  at [facility] and taken to hospital for 3 days with unrevealing tests. \par \par \par \par ECHO: 14: normal lv ef 65%, normal wall motion, trivial pericardial effusion, no significant valvular disease\par \par Imelda-Nuclear: : no ekg changes, normal perfusion, normal ef 66% and wall motion\par \par \par \par \par \par \par PMH/PSH:\par s/p robotic assisted lap. gastric bypass and supraumbilical hernia repair \par Non-insulin Diabetes - 20 years\par glaucoma\par plantar fascittis\par s/p open janice/appendix - age 22\par s/p left knee replacement - 2011\par bilateral cataract surgery - \par right vitrectomy - \par HTN\par morbid obesity\par osteopenia\par HL\par vasovagal syncope - \par \par \par SH:\par retired  - prior job - worked for [employer]/\par quit tobacco  - smoked on/off 10 years\par quit etoh \par no drugs\par single\par no children\par lives alone\par from [place]\par \par ALL:\par aspirin -> retinal bleed\par codeine -> constipation\par latex -> contact dermatitis\par iv dye -> shortness of breath\par lipitor/zocor -> stomach pain\par \par \par \par \par MEDS:\par asa 81 mg daily\par lisinopril 2.5 mg qd\par pravastatin 20 mg qhs\par metformin 500 mg qd\par citracal\par mvi\par vit B12/B1\par flax seed\par fesol\par \par \par \par FH:\par mother -  breast cancer age 60\par father -  lung cancer age 76\par 1 brother - age 28, healthy\par

## 2023-10-23 ENCOUNTER — OUTPATIENT (OUTPATIENT)
Dept: OUTPATIENT SERVICES | Facility: HOSPITAL | Age: 73
LOS: 1 days | End: 2023-10-23
Payer: MEDICARE

## 2023-10-23 DIAGNOSIS — Z96.652 PRESENCE OF LEFT ARTIFICIAL KNEE JOINT: Chronic | ICD-10-CM

## 2023-10-23 DIAGNOSIS — Z90.49 ACQUIRED ABSENCE OF OTHER SPECIFIED PARTS OF DIGESTIVE TRACT: Chronic | ICD-10-CM

## 2023-10-23 DIAGNOSIS — Z98.49 CATARACT EXTRACTION STATUS, UNSPECIFIED EYE: Chronic | ICD-10-CM

## 2023-10-23 DIAGNOSIS — I10 ESSENTIAL (PRIMARY) HYPERTENSION: ICD-10-CM

## 2023-10-23 DIAGNOSIS — Z98.89 OTHER SPECIFIED POSTPROCEDURAL STATES: Chronic | ICD-10-CM

## 2023-10-23 PROCEDURE — 93306 TTE W/DOPPLER COMPLETE: CPT

## 2023-10-23 PROCEDURE — 93306 TTE W/DOPPLER COMPLETE: CPT | Mod: 26

## 2023-11-09 ENCOUNTER — NON-APPOINTMENT (OUTPATIENT)
Age: 73
End: 2023-11-09

## 2023-11-15 ENCOUNTER — APPOINTMENT (OUTPATIENT)
Dept: HEART AND VASCULAR | Facility: CLINIC | Age: 73
End: 2023-11-15

## 2024-03-04 ENCOUNTER — RESULT REVIEW (OUTPATIENT)
Age: 74
End: 2024-03-04

## 2024-03-08 ENCOUNTER — NON-APPOINTMENT (OUTPATIENT)
Age: 74
End: 2024-03-08

## 2024-03-18 ENCOUNTER — APPOINTMENT (OUTPATIENT)
Dept: ENDOCRINOLOGY | Facility: CLINIC | Age: 74
End: 2024-03-18

## 2024-03-27 ENCOUNTER — APPOINTMENT (OUTPATIENT)
Dept: HEART AND VASCULAR | Facility: CLINIC | Age: 74
End: 2024-03-27
Payer: MEDICARE

## 2024-03-27 VITALS
DIASTOLIC BLOOD PRESSURE: 73 MMHG | BODY MASS INDEX: 29.77 KG/M2 | WEIGHT: 168 LBS | OXYGEN SATURATION: 98 % | TEMPERATURE: 97.2 F | HEIGHT: 63 IN | HEART RATE: 56 BPM | SYSTOLIC BLOOD PRESSURE: 128 MMHG

## 2024-03-27 PROCEDURE — G2211 COMPLEX E/M VISIT ADD ON: CPT

## 2024-03-27 PROCEDURE — 93000 ELECTROCARDIOGRAM COMPLETE: CPT

## 2024-03-27 PROCEDURE — 99214 OFFICE O/P EST MOD 30 MIN: CPT

## 2024-03-27 NOTE — DISCUSSION/SUMMARY
[Patient] : the patient [___ Month(s)] : in [unfilled] month(s) [EKG obtained to assist in diagnosis and management of assessed problem(s)] : EKG obtained to assist in diagnosis and management of assessed problem(s) [FreeTextEntry1] : 73 y/o female with h/o cad, htn, niddm, obesity, near-syncope, hl, s/p robotic assisted lap. gastric bypass and supraumbilical hernia repair here for f/up today  -increase to prava 40  -refer to [facility] for weight loss drugs  -labs 2023 reviewed, labs from pcp to be reviewed from 2/24  -ekg ordered today - atrial jerry, normal intervals, nsra  -continue asa, pravastatin  -blood sugar control and DM management - to continue with endo  -Echo 10/23: ef 65%, no wma, trace mr/tr, small pericardial effusion  -Calcium score 3/24:  Cardiac: 1.  The calcium score is moderate at 117 Agatston units, which is at the 70 percentile, adjusted for age, gender and race. 2.  Minimal aortic calcification. Non-cardiac: 1. Small focus of bronchiectasis, bronchial luminal impaction, and linear atelectasis or scar left lower lobe.  -Echo 8/14 showed nl ef/wall motion/no significant valvular disease  -Adenosine nuclear stress test 8/14 showed normal perfusion, no ekg changes  -agree w heme referral   -f/up 3 months for htn, lipids   I have spent 30 minutes reviewing labs, records, tests and discussing cvd risk factor management and htn, hl

## 2024-03-27 NOTE — HISTORY OF PRESENT ILLNESS
[FreeTextEntry1] : 75 y/o female with h/o cad, htn, niddm, obesity, near-syncope, hl, s/p robotic assisted lap. gastric bypass and supraumbilical hernia repair  who presents for f/up today    last seen   referred to see heme by pcp  -Echo 10/23: ef 65%, no wma, trace mr/tr, small pericardial effusion  -Calcium score 3/24:  Cardiac: 1.  The calcium score is moderate at 117 Agatston units, which is at the 70 percentile, adjusted for age, gender and race. 2.  Minimal aortic calcification. Non-cardiac: 1. Small focus of bronchiectasis, bronchial luminal impaction, and linear atelectasis or scar left lower lobe.  She denies cp, sob, palpitations, lh, syncope, edema, orthopnea, pnd.    Able to perform > 4 METS  She has h/o stable DM, no h/o chf, rf, cva, IHD    In  she had a normal echo and pharm nuclear stress test.  She was unable to tolerate both lipitor, zocor due to side effects and did not want to pay high copay for crestor. She had been using pravastatin and tolerating it.      PRIOR history:  Episode of vasovagal with near-syncope -  at [facility] and taken to hospital for 3 days with unrevealing tests.    ECHO: 14: normal lv ef 65%, normal wall motion, trivial pericardial effusion, no significant valvular disease    Imelda-Nuclear: : no ekg changes, normal perfusion, normal ef 66% and wall motion              PMH/PSH:  s/p robotic assisted lap. gastric bypass and supraumbilical hernia repair  Non-insulin Diabetes - 20 years  glaucoma  plantar fascittis  s/p open janice/appendix - age 22  s/p left knee replacement - 2011  bilateral cataract surgery -   right vitrectomy -   HTN  morbid obesity  osteopenia  HL  vasovagal syncope -   right upper inner thigh cyst excision   cad    SH:  retired  - prior job - worked for [employer]/  quit tobacco  - smoked on/off 10 years  quit etoh   no drugs  single  no children  lives alone  from [place]    ALL:  aspirin -> retinal bleed  codeine -> constipation  latex -> contact dermatitis  iv dye -> shortness of breath  lipitor/zocor -> stomach pain          MEDS:  asa 81 mg daily  pravastatin 20 mg qhs  metformin 500 mg tqd  citracal  mvi  vit B12/B1  omeprazole 40 mg qd          FH:  mother -  breast cancer age 60  father -  lung cancer age 76  1 brother - age 28, healthy

## 2024-04-26 ENCOUNTER — APPOINTMENT (OUTPATIENT)
Dept: HEART AND VASCULAR | Facility: CLINIC | Age: 74
End: 2024-04-26
Payer: MEDICARE

## 2024-04-26 VITALS
OXYGEN SATURATION: 99 % | HEIGHT: 63 IN | SYSTOLIC BLOOD PRESSURE: 170 MMHG | DIASTOLIC BLOOD PRESSURE: 80 MMHG | BODY MASS INDEX: 31.54 KG/M2 | TEMPERATURE: 97.2 F | HEART RATE: 58 BPM | WEIGHT: 178 LBS

## 2024-04-26 DIAGNOSIS — E11.9 TYPE 2 DIABETES MELLITUS W/OUT COMPLICATIONS: ICD-10-CM

## 2024-04-26 PROCEDURE — 99215 OFFICE O/P EST HI 40 MIN: CPT

## 2024-04-26 RX ORDER — TIRZEPATIDE 2.5 MG/.5ML
2.5 INJECTION, SOLUTION SUBCUTANEOUS
Qty: 1 | Refills: 2 | Status: ACTIVE | COMMUNITY
Start: 2024-04-26 | End: 1900-01-01

## 2024-04-26 RX ORDER — CHROMIUM 200 MCG
TABLET ORAL
Refills: 0 | Status: ACTIVE | COMMUNITY

## 2024-04-26 NOTE — HISTORY OF PRESENT ILLNESS
[FreeTextEntry1] : 73 y/o female with h/o DMII, cad, htn, obesity, near-syncope, hl, s/p robotic assisted lap. gastric bypass (8 years ago) and supraumbilical hernia repair  who presents today as a referral from Dr. Tracey for weight management and lifestyle counseling.  Pt saw Dr. Toney who previously suggested GLP1, but pt refused injectables. She previously had gastric bypass in 2016; lost 133 lbs and has now regained about 25 lbs all in her stomach. She is now interested in discussing the injectables.   She feels she eats well overall but her problem is portion control and also had knee replacement so feels somewhat limited by what activity she can do for exercise. Said she ate a lot of salt more recently and says her BP can be very labile.   Her PCP manages her DM; pt says he increased her to max metformin after seeing her labs last month (A1v 8.9%). She said she took Rybelsus in the past and had muscle spasms so she stopped that.   ========================================================================================== referred to see heme by pcp  -Echo 10/23: ef 65%, no wma, trace mr/tr, small pericardial effusion  -Calcium score 3/24:  Cardiac: 1.  The calcium score is moderate at 117 Agatston units, which is at the 70 percentile, adjusted for age, gender and race. 2.  Minimal aortic calcification. Non-cardiac: 1. Small focus of bronchiectasis, bronchial luminal impaction, and linear atelectasis or scar left lower lobe.  She denies cp, sob, palpitations, lh, syncope, edema, orthopnea, pnd.    Able to perform > 4 METS  She has h/o stable DM, no h/o chf, rf, cva, IHD    In  she had a normal echo and pharm nuclear stress test.  She was unable to tolerate both lipitor, zocor due to side effects and did not want to pay high copay for crestor. She had been using pravastatin and tolerating it.      PRIOR history:  Episode of vasovagal with near-syncope -  at [facility] and taken to hospital for 3 days with unrevealing tests.    ECHO: 14: normal lv ef 65%, normal wall motion, trivial pericardial effusion, no significant valvular disease    Imelda-Nuclear: : no ekg changes, normal perfusion, normal ef 66% and wall motion              PMH/PSH:  s/p robotic assisted lap. gastric bypass and supraumbilical hernia repair  Non-insulin Diabetes - 20 years  glaucoma  plantar fascittis  s/p open janice/appendix - age 22  s/p left knee replacement - 2011  bilateral cataract surgery -   right vitrectomy -   HTN  morbid obesity  osteopenia  HL  vasovagal syncope -   right upper inner thigh cyst excision   cad    SH:  retired  - prior job - worked for [employer]  quit tobacco  - smoked on/off 10 years  quit etoh   no drugs  single  no children  lives alone  from [place]    ALL:  aspirin -> retinal bleed  codeine -> constipation  latex -> contact dermatitis  iv dye -> shortness of breath  lipitor/zocor -> stomach pain          MEDS:  asa 81 mg daily  pravastatin 20 mg qhs  metformin 500 mg tqd  citracal  mvi  vit B12/B1  omeprazole 40 mg qd          FH:  mother -  breast cancer age 60  father -  lung cancer age 76  1 brother - age 28, healthy

## 2024-04-26 NOTE — PHYSICAL EXAM
[No Acute Distress] : no acute distress [Obese] : obese [Normal] : normal conjunctiva [Normal Gait] : normal gait [Moves all extremities] : moves all extremities [Alert and Oriented] : alert and oriented

## 2024-04-26 NOTE — REASON FOR VISIT
[CV Risk Factors and Non-Cardiac Disease] : CV risk factors and non-cardiac disease [FreeTextEntry3] : Dr. Tracey

## 2024-04-26 NOTE — DISCUSSION/SUMMARY
[FreeTextEntry1] : 75 y/o female with h/o DMII, cad, htn, obesity, near-syncope, hl, s/p robotic assisted lap. gastric bypass and supraumbilical hernia repair 5/16 who presents today as a referral from Dr. Tracey for weight management and lifestyle counseling.  Dietary and exercise habits reviewed and discussed with over 50% of the visit spent discussing cardiovascular disease, the optimization of risk factors and lifestyle strategies that can be used to achieve this.  DMII: A1c 8.9% on 3/13/24; uncontrolled and on max metformin currently  -The cardiometabolic benefits of GLP-1 agonists were discussed as well as the possible side effects, including injection site reaction, nausea, vomiting, diarrhea, constipation, gastroparesis, dehydration that could worsen kidney function, mood changes, worsening diabetic retinopathy, dizziness and headaches. Pancreatitis and kidney failure history were reviewed since caution should be taken in patients with a history of either condition. Encouraged adequate hydration, especially in the setting of reduced food volume while taking the drug. Patient denies a personal or family history of medullary thyroid cancer or MEN2. Advised that the risk of hypoglycemia with the drug class is rare, but does increase if combined with insulin or other oral diabetes medications. - pt prefers to avoid semaglutide based on ADEs from Rybelsus; will try for Mounjaro next choice Trulicity  - Encouraged patient to continue healthy exercise and eating habits, focusing on a Mediterranean style of eating w/ more plant based foods in general, and aiming for the recommended 150 minutes per week of moderate physical activity.  Will f/u w/ next steps after coverage determined. Pt will continue to follow with Dr. Tracey.

## 2024-05-09 ENCOUNTER — NON-APPOINTMENT (OUTPATIENT)
Age: 74
End: 2024-05-09

## 2024-07-23 ENCOUNTER — APPOINTMENT (OUTPATIENT)
Dept: ENDOCRINOLOGY | Facility: CLINIC | Age: 74
End: 2024-07-23

## 2024-09-30 ENCOUNTER — RX RENEWAL (OUTPATIENT)
Age: 74
End: 2024-09-30

## 2024-09-30 RX ORDER — PRAVASTATIN SODIUM 40 MG/1
40 TABLET ORAL
Qty: 90 | Refills: 1 | Status: ACTIVE | COMMUNITY
Start: 2024-09-30 | End: 1900-01-01

## 2024-11-13 ENCOUNTER — APPOINTMENT (OUTPATIENT)
Dept: ENDOCRINOLOGY | Facility: CLINIC | Age: 74
End: 2024-11-13
Payer: MEDICARE

## 2024-11-13 VITALS
DIASTOLIC BLOOD PRESSURE: 71 MMHG | HEART RATE: 56 BPM | WEIGHT: 181 LBS | BODY MASS INDEX: 32.06 KG/M2 | SYSTOLIC BLOOD PRESSURE: 134 MMHG

## 2024-11-13 LAB
GLUCOSE BLDC GLUCOMTR-MCNC: 199
HBA1C MFR BLD HPLC: 11.1

## 2024-11-13 PROCEDURE — G2211 COMPLEX E/M VISIT ADD ON: CPT

## 2024-11-13 PROCEDURE — 99214 OFFICE O/P EST MOD 30 MIN: CPT

## 2024-11-13 PROCEDURE — 82962 GLUCOSE BLOOD TEST: CPT

## 2024-11-13 PROCEDURE — 83036 HEMOGLOBIN GLYCOSYLATED A1C: CPT | Mod: QW

## 2024-11-13 RX ORDER — EMPAGLIFLOZIN 10 MG/1
10 TABLET, FILM COATED ORAL
Qty: 90 | Refills: 1 | Status: ACTIVE | COMMUNITY
Start: 2024-11-13 | End: 1900-01-01

## 2024-11-13 RX ORDER — GLIMEPIRIDE 2 MG/1
2 TABLET ORAL
Qty: 90 | Refills: 1 | Status: ACTIVE | COMMUNITY
Start: 2024-11-13 | End: 1900-01-01

## 2024-11-15 ENCOUNTER — RX RENEWAL (OUTPATIENT)
Age: 74
End: 2024-11-15

## 2024-11-19 ENCOUNTER — NON-APPOINTMENT (OUTPATIENT)
Age: 74
End: 2024-11-19

## 2024-11-19 ENCOUNTER — APPOINTMENT (OUTPATIENT)
Dept: HEART AND VASCULAR | Facility: CLINIC | Age: 74
End: 2024-11-19
Payer: MEDICARE

## 2024-11-19 VITALS
DIASTOLIC BLOOD PRESSURE: 76 MMHG | HEIGHT: 63 IN | HEART RATE: 74 BPM | BODY MASS INDEX: 31.89 KG/M2 | WEIGHT: 180 LBS | OXYGEN SATURATION: 96 % | TEMPERATURE: 97.1 F | SYSTOLIC BLOOD PRESSURE: 131 MMHG

## 2024-11-19 DIAGNOSIS — I25.10 ATHEROSCLEROTIC HEART DISEASE OF NATIVE CORONARY ARTERY W/OUT ANGINA PECTORIS: ICD-10-CM

## 2024-11-19 PROCEDURE — 36415 COLL VENOUS BLD VENIPUNCTURE: CPT

## 2024-11-19 PROCEDURE — G2211 COMPLEX E/M VISIT ADD ON: CPT

## 2024-11-19 PROCEDURE — 99214 OFFICE O/P EST MOD 30 MIN: CPT

## 2024-11-19 PROCEDURE — 93000 ELECTROCARDIOGRAM COMPLETE: CPT

## 2024-11-20 LAB
BASOPHILS # BLD AUTO: 0.03 K/UL
BASOPHILS NFR BLD AUTO: 0.7 %
CHOLEST SERPL-MCNC: 173 MG/DL
EOSINOPHIL # BLD AUTO: 0.07 K/UL
EOSINOPHIL NFR BLD AUTO: 1.5 %
HCT VFR BLD CALC: 40.5 %
HDLC SERPL-MCNC: 90 MG/DL
HGB BLD-MCNC: 12.1 G/DL
IMM GRANULOCYTES NFR BLD AUTO: 0 %
LDLC SERPL CALC-MCNC: 68 MG/DL
LYMPHOCYTES # BLD AUTO: 1.55 K/UL
LYMPHOCYTES NFR BLD AUTO: 34.3 %
MAN DIFF?: NORMAL
MCHC RBC-ENTMCNC: 21.8 PG
MCHC RBC-ENTMCNC: 29.9 G/DL
MCV RBC AUTO: 73 FL
MONOCYTES # BLD AUTO: 0.37 K/UL
MONOCYTES NFR BLD AUTO: 8.2 %
NEUTROPHILS # BLD AUTO: 2.5 K/UL
NEUTROPHILS NFR BLD AUTO: 55.3 %
NONHDLC SERPL-MCNC: 82 MG/DL
PLATELET # BLD AUTO: 250 K/UL
RBC # BLD: 5.55 M/UL
RBC # FLD: 14.6 %
TRIGL SERPL-MCNC: 77 MG/DL
WBC # FLD AUTO: 4.52 K/UL

## 2024-11-23 LAB — APO LP(A) SERPL-MCNC: 101.7 NMOL/L

## 2024-12-23 ENCOUNTER — RX RENEWAL (OUTPATIENT)
Age: 74
End: 2024-12-23

## 2025-05-16 ENCOUNTER — RX RENEWAL (OUTPATIENT)
Age: 75
End: 2025-05-16

## 2025-05-16 ENCOUNTER — NON-APPOINTMENT (OUTPATIENT)
Age: 75
End: 2025-05-16

## 2025-05-19 ENCOUNTER — RX RENEWAL (OUTPATIENT)
Age: 75
End: 2025-05-19

## 2025-05-27 ENCOUNTER — APPOINTMENT (OUTPATIENT)
Dept: HEART AND VASCULAR | Facility: CLINIC | Age: 75
End: 2025-05-27
Payer: MEDICARE

## 2025-05-27 ENCOUNTER — NON-APPOINTMENT (OUTPATIENT)
Age: 75
End: 2025-05-27

## 2025-05-27 VITALS — SYSTOLIC BLOOD PRESSURE: 127 MMHG | DIASTOLIC BLOOD PRESSURE: 80 MMHG

## 2025-05-27 VITALS
BODY MASS INDEX: 29.95 KG/M2 | HEIGHT: 63 IN | HEART RATE: 78 BPM | DIASTOLIC BLOOD PRESSURE: 72 MMHG | SYSTOLIC BLOOD PRESSURE: 147 MMHG | OXYGEN SATURATION: 98 % | TEMPERATURE: 97.2 F | WEIGHT: 169 LBS

## 2025-05-27 DIAGNOSIS — I31.39 OTHER PERICARDIAL EFFUSION (NONINFLAMMATORY): ICD-10-CM

## 2025-05-27 PROCEDURE — G2211 COMPLEX E/M VISIT ADD ON: CPT

## 2025-05-27 PROCEDURE — 99214 OFFICE O/P EST MOD 30 MIN: CPT

## 2025-05-27 PROCEDURE — 36415 COLL VENOUS BLD VENIPUNCTURE: CPT

## 2025-05-27 PROCEDURE — 93000 ELECTROCARDIOGRAM COMPLETE: CPT

## 2025-05-28 LAB
ALBUMIN SERPL ELPH-MCNC: 4.5 G/DL
ALP BLD-CCNC: 85 U/L
ALT SERPL-CCNC: 25 U/L
ANION GAP SERPL CALC-SCNC: 14 MMOL/L
AST SERPL-CCNC: 31 U/L
BASOPHILS # BLD AUTO: 0.05 K/UL
BASOPHILS NFR BLD AUTO: 1 %
BILIRUB SERPL-MCNC: 0.4 MG/DL
BUN SERPL-MCNC: 21 MG/DL
CALCIUM SERPL-MCNC: 10.3 MG/DL
CHLORIDE SERPL-SCNC: 100 MMOL/L
CHOLEST SERPL-MCNC: 217 MG/DL
CO2 SERPL-SCNC: 25 MMOL/L
CREAT SERPL-MCNC: 0.9 MG/DL
CREAT SPEC-SCNC: 113 MG/DL
EGFRCR SERPLBLD CKD-EPI 2021: 68 ML/MIN/1.73M2
EOSINOPHIL # BLD AUTO: 0.14 K/UL
EOSINOPHIL NFR BLD AUTO: 2.9 %
ESTIMATED AVERAGE GLUCOSE: 171 MG/DL
GLUCOSE SERPL-MCNC: 124 MG/DL
HBA1C MFR BLD HPLC: 7.6 %
HCT VFR BLD CALC: 43.5 %
HDLC SERPL-MCNC: 108 MG/DL
HGB BLD-MCNC: 12.6 G/DL
IMM GRANULOCYTES NFR BLD AUTO: 0 %
LDLC SERPL-MCNC: 94 MG/DL
LYMPHOCYTES # BLD AUTO: 1.44 K/UL
LYMPHOCYTES NFR BLD AUTO: 29.9 %
MAGNESIUM SERPL-MCNC: 2.4 MG/DL
MAN DIFF?: NORMAL
MCHC RBC-ENTMCNC: 21.9 PG
MCHC RBC-ENTMCNC: 29 G/DL
MCV RBC AUTO: 75.7 FL
MICROALBUMIN 24H UR DL<=1MG/L-MCNC: <1.2 MG/DL
MICROALBUMIN/CREAT 24H UR-RTO: NORMAL MG/G
MONOCYTES # BLD AUTO: 0.47 K/UL
MONOCYTES NFR BLD AUTO: 9.8 %
NEUTROPHILS # BLD AUTO: 2.71 K/UL
NEUTROPHILS NFR BLD AUTO: 56.4 %
NONHDLC SERPL-MCNC: 109 MG/DL
PLATELET # BLD AUTO: 290 K/UL
POTASSIUM SERPL-SCNC: 4.4 MMOL/L
PROT SERPL-MCNC: 7.5 G/DL
RBC # BLD: 5.75 M/UL
RBC # FLD: 16.2 %
SODIUM SERPL-SCNC: 138 MMOL/L
TRIGL SERPL-MCNC: 82 MG/DL
TSH SERPL-ACNC: 1.09 UIU/ML
WBC # FLD AUTO: 4.81 K/UL

## 2025-06-05 ENCOUNTER — NON-APPOINTMENT (OUTPATIENT)
Age: 75
End: 2025-06-05

## 2025-06-17 ENCOUNTER — APPOINTMENT (OUTPATIENT)
Dept: ENDOCRINOLOGY | Facility: CLINIC | Age: 75
End: 2025-06-17
Payer: MEDICARE

## 2025-06-17 VITALS
SYSTOLIC BLOOD PRESSURE: 111 MMHG | WEIGHT: 166 LBS | HEART RATE: 61 BPM | DIASTOLIC BLOOD PRESSURE: 68 MMHG | BODY MASS INDEX: 29.41 KG/M2

## 2025-06-17 LAB
GLUCOSE BLDC GLUCOMTR-MCNC: 135
HBA1C MFR BLD HPLC: 7.3

## 2025-06-17 PROCEDURE — 82962 GLUCOSE BLOOD TEST: CPT

## 2025-06-17 PROCEDURE — 83036 HEMOGLOBIN GLYCOSYLATED A1C: CPT | Mod: QW

## 2025-06-17 PROCEDURE — 99214 OFFICE O/P EST MOD 30 MIN: CPT

## 2025-06-17 PROCEDURE — G2211 COMPLEX E/M VISIT ADD ON: CPT

## 2025-06-30 RX ORDER — EZETIMIBE 10 MG/1
10 TABLET ORAL
Qty: 90 | Refills: 1 | Status: ACTIVE | COMMUNITY
Start: 2025-06-30 | End: 1900-01-01